# Patient Record
Sex: FEMALE | Race: WHITE | Employment: OTHER | URBAN - METROPOLITAN AREA
[De-identification: names, ages, dates, MRNs, and addresses within clinical notes are randomized per-mention and may not be internally consistent; named-entity substitution may affect disease eponyms.]

---

## 2017-04-19 ENCOUNTER — HOSPITAL ENCOUNTER (OUTPATIENT)
Age: 72
Discharge: HOME | End: 2017-04-19
Payer: MEDICARE

## 2017-04-19 ENCOUNTER — APPOINTMENT (OUTPATIENT)
Dept: GENERAL RADIOLOGY | Age: 72
End: 2017-04-19
Attending: PHYSICIAN ASSISTANT
Payer: MEDICARE

## 2017-04-19 VITALS
DIASTOLIC BLOOD PRESSURE: 73 MMHG | WEIGHT: 165 LBS | HEART RATE: 93 BPM | RESPIRATION RATE: 16 BRPM | OXYGEN SATURATION: 100 % | TEMPERATURE: 98 F | SYSTOLIC BLOOD PRESSURE: 165 MMHG | BODY MASS INDEX: 25 KG/M2

## 2017-04-19 DIAGNOSIS — S80.00XA CONTUSION OF KNEE, UNSPECIFIED LATERALITY, INITIAL ENCOUNTER: ICD-10-CM

## 2017-04-19 DIAGNOSIS — T07.XXXA ABRASIONS OF MULTIPLE SITES: ICD-10-CM

## 2017-04-19 DIAGNOSIS — S00.83XA FACIAL CONTUSION, INITIAL ENCOUNTER: Primary | ICD-10-CM

## 2017-04-19 PROCEDURE — 99203 OFFICE O/P NEW LOW 30 MIN: CPT

## 2017-04-19 PROCEDURE — 99204 OFFICE O/P NEW MOD 45 MIN: CPT

## 2017-04-19 PROCEDURE — 70150 X-RAY EXAM OF FACIAL BONES: CPT

## 2017-04-19 RX ORDER — HYDROCODONE BITARTRATE AND ACETAMINOPHEN 5; 325 MG/1; MG/1
1 TABLET ORAL ONCE
Status: COMPLETED | OUTPATIENT
Start: 2017-04-19 | End: 2017-04-19

## 2017-04-19 RX ORDER — HYDROCODONE BITARTRATE AND ACETAMINOPHEN 5; 325 MG/1; MG/1
1-2 TABLET ORAL EVERY 4 HOURS PRN
Qty: 20 TABLET | Refills: 0 | Status: SHIPPED | OUTPATIENT
Start: 2017-04-19 | End: 2017-04-26

## 2017-04-20 NOTE — ED PROVIDER NOTES
Patient Seen in: [facility] [place] [facility] In [place] [facility]    History   Patient presents with:  Fall (musculoskeletal, neurologic)    Stated Complaint: FACIAL INJ S/P FALL    HPI    71 yo female here with c/o swollen nose/chipped tooth and chin swelling after All other systems reviewed and negative except as noted above. PSFH elements reviewed from today and agreed except as otherwise stated in HPI.     Physical Exam       ED Triage Vitals   BP 04/19/17 2001 163/88 mmHg   Pulse 04/19/17 2001 93   Resp 04/19/1 4/19/2017  PROCEDURE:  XR FACIAL BONES, COMPLETE (MIN 3 VIEWS) (CPT=70150)  COMPARISON:  None. INDICATIONS:  FACIAL INJ S/P FALL  PATIENT STATED HISTORY: (As transcribed by Technologist)  Pt with facial injury after fall earlier today.  Pt denies any speci Schedule an appointment as soon as possible for a visit  or F/U with your dentist      Medications Prescribed:  Current Discharge Medication List    START taking these medications    HYDROcodone-acetaminophen 5-325 MG Oral Tab  Take 1-2 tablets by mouth ev

## 2023-06-14 ENCOUNTER — NURSE ONLY (OUTPATIENT)
Dept: ELECTROPHYSIOLOGY | Facility: HOSPITAL | Age: 78
End: 2023-06-14
Attending: EMERGENCY MEDICINE
Payer: MEDICARE

## 2023-06-14 ENCOUNTER — APPOINTMENT (OUTPATIENT)
Dept: GENERAL RADIOLOGY | Facility: HOSPITAL | Age: 78
End: 2023-06-14
Attending: EMERGENCY MEDICINE
Payer: MEDICARE

## 2023-06-14 ENCOUNTER — APPOINTMENT (OUTPATIENT)
Dept: CT IMAGING | Facility: HOSPITAL | Age: 78
End: 2023-06-14
Attending: EMERGENCY MEDICINE
Payer: MEDICARE

## 2023-06-14 ENCOUNTER — HOSPITAL ENCOUNTER (OUTPATIENT)
Facility: HOSPITAL | Age: 78
Setting detail: OBSERVATION
LOS: 1 days | Discharge: HOME | End: 2023-06-15
Attending: EMERGENCY MEDICINE | Admitting: HOSPITALIST
Payer: MEDICARE

## 2023-06-14 ENCOUNTER — APPOINTMENT (OUTPATIENT)
Dept: MRI IMAGING | Facility: HOSPITAL | Age: 78
End: 2023-06-14
Attending: EMERGENCY MEDICINE
Payer: MEDICARE

## 2023-06-14 DIAGNOSIS — S01.511A LIP LACERATION, INITIAL ENCOUNTER: ICD-10-CM

## 2023-06-14 DIAGNOSIS — S40.012A CONTUSION OF LEFT SHOULDER, INITIAL ENCOUNTER: ICD-10-CM

## 2023-06-14 DIAGNOSIS — R55 SYNCOPE AND COLLAPSE: Primary | ICD-10-CM

## 2023-06-14 DIAGNOSIS — R40.4 EPISODE OF ALTERED CONSCIOUSNESS: ICD-10-CM

## 2023-06-14 LAB
ALBUMIN SERPL-MCNC: 3.9 G/DL (ref 3.4–5)
ALBUMIN/GLOB SERPL: 1.1 {RATIO} (ref 1–2)
ALP LIVER SERPL-CCNC: 88 U/L
ALT SERPL-CCNC: 31 U/L
ANION GAP SERPL CALC-SCNC: 8 MMOL/L (ref 0–18)
APTT PPP: 24.3 SECONDS (ref 23.3–35.6)
AST SERPL-CCNC: 26 U/L (ref 15–37)
ATRIAL RATE: 76 BPM
BASOPHILS # BLD AUTO: 0.05 X10(3) UL (ref 0–0.2)
BASOPHILS NFR BLD AUTO: 0.7 %
BILIRUB SERPL-MCNC: 1.4 MG/DL (ref 0.1–2)
BUN BLD-MCNC: 15 MG/DL (ref 7–18)
CALCIUM BLD-MCNC: 9.2 MG/DL (ref 8.5–10.1)
CHLORIDE SERPL-SCNC: 105 MMOL/L (ref 98–112)
CO2 SERPL-SCNC: 26 MMOL/L (ref 21–32)
CREAT BLD-MCNC: 0.88 MG/DL
EOSINOPHIL # BLD AUTO: 0.09 X10(3) UL (ref 0–0.7)
EOSINOPHIL NFR BLD AUTO: 1.2 %
ERYTHROCYTE [DISTWIDTH] IN BLOOD BY AUTOMATED COUNT: 12.9 %
GFR SERPLBLD BASED ON 1.73 SQ M-ARVRAT: 67 ML/MIN/1.73M2 (ref 60–?)
GLOBULIN PLAS-MCNC: 3.5 G/DL (ref 2.8–4.4)
GLUCOSE BLD-MCNC: 130 MG/DL (ref 70–99)
GLUCOSE BLD-MCNC: 134 MG/DL (ref 70–99)
HCT VFR BLD AUTO: 45.8 %
HGB BLD-MCNC: 14.7 G/DL
IMM GRANULOCYTES # BLD AUTO: 0.01 X10(3) UL (ref 0–1)
IMM GRANULOCYTES NFR BLD: 0.1 %
INR BLD: 0.92 (ref 0.85–1.16)
LYMPHOCYTES # BLD AUTO: 1.57 X10(3) UL (ref 1–4)
LYMPHOCYTES NFR BLD AUTO: 21.3 %
MCH RBC QN AUTO: 29.9 PG (ref 26–34)
MCHC RBC AUTO-ENTMCNC: 32.1 G/DL (ref 31–37)
MCV RBC AUTO: 93.3 FL
MONOCYTES # BLD AUTO: 0.56 X10(3) UL (ref 0.1–1)
MONOCYTES NFR BLD AUTO: 7.6 %
NEUTROPHILS # BLD AUTO: 5.09 X10 (3) UL (ref 1.5–7.7)
NEUTROPHILS # BLD AUTO: 5.09 X10(3) UL (ref 1.5–7.7)
NEUTROPHILS NFR BLD AUTO: 69.1 %
OSMOLALITY SERPL CALC.SUM OF ELEC: 291 MOSM/KG (ref 275–295)
P AXIS: 68 DEGREES
P-R INTERVAL: 142 MS
PLATELET # BLD AUTO: 289 10(3)UL (ref 150–450)
POTASSIUM SERPL-SCNC: 3.6 MMOL/L (ref 3.5–5.1)
PROT SERPL-MCNC: 7.4 G/DL (ref 6.4–8.2)
PROTHROMBIN TIME: 12.3 SECONDS (ref 11.6–14.8)
Q-T INTERVAL: 400 MS
QRS DURATION: 80 MS
QTC CALCULATION (BEZET): 450 MS
R AXIS: 0 DEGREES
RBC # BLD AUTO: 4.91 X10(6)UL
SODIUM SERPL-SCNC: 139 MMOL/L (ref 136–145)
T AXIS: 4 DEGREES
TROPONIN I HIGH SENSITIVITY: 12 NG/L
TROPONIN I HIGH SENSITIVITY: 5 NG/L
VENTRICULAR RATE: 76 BPM
WBC # BLD AUTO: 7.4 X10(3) UL (ref 4–11)

## 2023-06-14 PROCEDURE — 70450 CT HEAD/BRAIN W/O DYE: CPT | Performed by: EMERGENCY MEDICINE

## 2023-06-14 PROCEDURE — 70553 MRI BRAIN STEM W/O & W/DYE: CPT | Performed by: EMERGENCY MEDICINE

## 2023-06-14 PROCEDURE — 95819 EEG AWAKE AND ASLEEP: CPT | Performed by: OTHER

## 2023-06-14 PROCEDURE — 70546 MR ANGIOGRAPH HEAD W/O&W/DYE: CPT | Performed by: EMERGENCY MEDICINE

## 2023-06-14 PROCEDURE — 73030 X-RAY EXAM OF SHOULDER: CPT | Performed by: EMERGENCY MEDICINE

## 2023-06-14 PROCEDURE — 70549 MR ANGIOGRAPH NECK W/O&W/DYE: CPT | Performed by: EMERGENCY MEDICINE

## 2023-06-14 RX ORDER — TRAMADOL HYDROCHLORIDE 50 MG/1
50 TABLET ORAL EVERY 6 HOURS PRN
Status: DISCONTINUED | OUTPATIENT
Start: 2023-06-14 | End: 2023-06-15

## 2023-06-14 RX ORDER — METOPROLOL SUCCINATE 25 MG/1
25 TABLET, EXTENDED RELEASE ORAL DAILY
Status: DISCONTINUED | OUTPATIENT
Start: 2023-06-15 | End: 2023-06-15

## 2023-06-14 RX ORDER — KETOROLAC TROMETHAMINE 15 MG/ML
15 INJECTION, SOLUTION INTRAMUSCULAR; INTRAVENOUS ONCE
Status: COMPLETED | OUTPATIENT
Start: 2023-06-14 | End: 2023-06-14

## 2023-06-14 RX ORDER — POTASSIUM CHLORIDE 20 MEQ/1
40 TABLET, EXTENDED RELEASE ORAL EVERY 4 HOURS
Status: COMPLETED | OUTPATIENT
Start: 2023-06-14 | End: 2023-06-15

## 2023-06-14 RX ORDER — KETOROLAC TROMETHAMINE 15 MG/ML
INJECTION, SOLUTION INTRAMUSCULAR; INTRAVENOUS
Status: DISPENSED
Start: 2023-06-14 | End: 2023-06-15

## 2023-06-14 RX ORDER — MELATONIN
3 NIGHTLY PRN
Status: DISCONTINUED | OUTPATIENT
Start: 2023-06-14 | End: 2023-06-15

## 2023-06-14 RX ORDER — ONDANSETRON 2 MG/ML
4 INJECTION INTRAMUSCULAR; INTRAVENOUS EVERY 6 HOURS PRN
Status: DISCONTINUED | OUTPATIENT
Start: 2023-06-14 | End: 2023-06-15

## 2023-06-14 RX ORDER — GADOTERATE MEGLUMINE 376.9 MG/ML
15 INJECTION INTRAVENOUS
Status: COMPLETED | OUTPATIENT
Start: 2023-06-14 | End: 2023-06-14

## 2023-06-14 RX ORDER — ACETAMINOPHEN 325 MG/1
650 TABLET ORAL EVERY 6 HOURS PRN
Status: DISCONTINUED | OUTPATIENT
Start: 2023-06-14 | End: 2023-06-15

## 2023-06-14 RX ORDER — ATORVASTATIN CALCIUM 10 MG/1
10 TABLET, FILM COATED ORAL NIGHTLY
Status: DISCONTINUED | OUTPATIENT
Start: 2023-06-14 | End: 2023-06-15

## 2023-06-14 NOTE — ED PROVIDER NOTES
This is a procedure note only. See physician note for full care details. Procedure - Suture    UNIVERSAL PROTOCOL    - Verbal consent obtained by patient and/or guardian for wound closure. - Procedure / Risks / Benefits/ Alternatives discussed, with questions answered to satisfaction. ANESTHESIA  Method (topical, local, nerve block): Topical (LET)    TREATMENT  Cleansed with: saline and Chlorhexidine   Amount of cleaning: standard  Location: Jagged to Left upper lip involving vermilion border. Length: approx 2cm  Repair method (sutures, staples, steri strips): sutures 6-0  # Sutures / staples / steri strips: 7  Approximation (close, loose): close approximation with attempt to align vermilion border.   Repair type (simple, complex): simple  Dressing: none  Procedure Completion: Tolerated well without immediate complications

## 2023-06-14 NOTE — PROCEDURES
.  EEG REPORT;    Reason for Examination: Syncopal episode    Technical Summary:   18 Channels of EEG and 1 Channel of EKG was performed utilizing internation 10/20 method. Background Activity: The background activity consisted of 9 Hz waveforms, reactive to eye opening/ external stimulation. Activation:    Hyperventilation:   Not performed. Photic Stimulation:  Driving response seen. Sleep:  Stage I sleep seen. Impression:  Normal EEG. No focal, lateralized or generalized epileptiform activity seen. Missy Avila MD  [facility].

## 2023-06-14 NOTE — PLAN OF CARE
NURSING ADMISSION NOTE      Patient admitted via Cart  Oriented to room. Safety precautions initiated. Bed in low position. Call light in reach.   Navigator completed  Neuro consulted  Staff will continue to monitor

## 2023-06-14 NOTE — ED QUICK NOTES
Rounding Completed    Plan of Care reviewed. Waiting for inpatient room. Elimination needs assessed. Bed is locked and in lowest position. Call light within reach.

## 2023-06-14 NOTE — ED INITIAL ASSESSMENT (HPI)
PT TO ER FROM HOME W/ DAUGHTER. PATIENT WENT ON A WALK W/ HER DOG. PER DAUGHTER, PATIENT COME HOME FROM WALK 3-4 MINUTES LATER AND WAS CONFUSED & WAS STUMBLING IN KITCHEN. CUT NOTICED TO LEFT LIP. PATIENT DOES NOT REMEMBER FALLING.

## 2023-06-14 NOTE — ED QUICK NOTES
Orders for admission, patient is aware of plan and ready to go upstairs. Any questions, please call ED RN Apoorva Patrick at extension [phone].      Patient Covid vaccination status: Fully vaccinated     COVID Test Ordered in ED: None    COVID Suspicion at Admission: N/A    Running Infusions:  None    Mental Status/LOC at time of transport: a/ox4    Other pertinent information:   CIWA score: N/A   NIH score:  N/A

## 2023-06-15 ENCOUNTER — APPOINTMENT (OUTPATIENT)
Dept: CV DIAGNOSTICS | Facility: HOSPITAL | Age: 78
End: 2023-06-15
Attending: HOSPITALIST
Payer: MEDICARE

## 2023-06-15 VITALS
BODY MASS INDEX: 25.9 KG/M2 | DIASTOLIC BLOOD PRESSURE: 81 MMHG | OXYGEN SATURATION: 96 % | RESPIRATION RATE: 20 BRPM | TEMPERATURE: 98 F | HEART RATE: 77 BPM | SYSTOLIC BLOOD PRESSURE: 136 MMHG | HEIGHT: 67 IN | WEIGHT: 165 LBS

## 2023-06-15 LAB
ANION GAP SERPL CALC-SCNC: 6 MMOL/L (ref 0–18)
BASOPHILS # BLD AUTO: 0.02 X10(3) UL (ref 0–0.2)
BASOPHILS NFR BLD AUTO: 0.2 %
BILIRUB UR QL STRIP.AUTO: NEGATIVE
BUN BLD-MCNC: 13 MG/DL (ref 7–18)
CALCIUM BLD-MCNC: 9.3 MG/DL (ref 8.5–10.1)
CHLORIDE SERPL-SCNC: 108 MMOL/L (ref 98–112)
CLARITY UR REFRACT.AUTO: CLEAR
CO2 SERPL-SCNC: 25 MMOL/L (ref 21–32)
CREAT BLD-MCNC: 0.74 MG/DL
EOSINOPHIL # BLD AUTO: 0.11 X10(3) UL (ref 0–0.7)
EOSINOPHIL NFR BLD AUTO: 1.3 %
ERYTHROCYTE [DISTWIDTH] IN BLOOD BY AUTOMATED COUNT: 13.2 %
GFR SERPLBLD BASED ON 1.73 SQ M-ARVRAT: 83 ML/MIN/1.73M2 (ref 60–?)
GLUCOSE BLD-MCNC: 114 MG/DL (ref 70–99)
GLUCOSE UR STRIP.AUTO-MCNC: NEGATIVE MG/DL
HCT VFR BLD AUTO: 41.8 %
HGB BLD-MCNC: 13.7 G/DL
IMM GRANULOCYTES # BLD AUTO: 0.02 X10(3) UL (ref 0–1)
IMM GRANULOCYTES NFR BLD: 0.2 %
KETONES UR STRIP.AUTO-MCNC: NEGATIVE MG/DL
LEUKOCYTE ESTERASE UR QL STRIP.AUTO: NEGATIVE
LYMPHOCYTES # BLD AUTO: 1.23 X10(3) UL (ref 1–4)
LYMPHOCYTES NFR BLD AUTO: 14.2 %
MCH RBC QN AUTO: 30 PG (ref 26–34)
MCHC RBC AUTO-ENTMCNC: 32.8 G/DL (ref 31–37)
MCV RBC AUTO: 91.7 FL
MONOCYTES # BLD AUTO: 0.87 X10(3) UL (ref 0.1–1)
MONOCYTES NFR BLD AUTO: 10.1 %
NEUTROPHILS # BLD AUTO: 6.39 X10 (3) UL (ref 1.5–7.7)
NEUTROPHILS # BLD AUTO: 6.39 X10(3) UL (ref 1.5–7.7)
NEUTROPHILS NFR BLD AUTO: 74 %
NITRITE UR QL STRIP.AUTO: NEGATIVE
OSMOLALITY SERPL CALC.SUM OF ELEC: 289 MOSM/KG (ref 275–295)
PH UR STRIP.AUTO: 5 [PH] (ref 5–8)
PLATELET # BLD AUTO: 263 10(3)UL (ref 150–450)
POTASSIUM SERPL-SCNC: 4.7 MMOL/L (ref 3.5–5.1)
POTASSIUM SERPL-SCNC: 4.7 MMOL/L (ref 3.5–5.1)
PROT UR STRIP.AUTO-MCNC: NEGATIVE MG/DL
RBC # BLD AUTO: 4.56 X10(6)UL
SODIUM SERPL-SCNC: 139 MMOL/L (ref 136–145)
SP GR UR STRIP.AUTO: 1.01 (ref 1–1.03)
TROPONIN I HIGH SENSITIVITY: 9 NG/L
UROBILINOGEN UR STRIP.AUTO-MCNC: <2 MG/DL
WBC # BLD AUTO: 8.6 X10(3) UL (ref 4–11)

## 2023-06-15 PROCEDURE — 99203 OFFICE O/P NEW LOW 30 MIN: CPT | Performed by: OTHER

## 2023-06-15 PROCEDURE — 93306 TTE W/DOPPLER COMPLETE: CPT | Performed by: HOSPITALIST

## 2023-06-15 RX ORDER — TRAMADOL HYDROCHLORIDE 50 MG/1
50 TABLET ORAL EVERY 6 HOURS PRN
Qty: 16 TABLET | Refills: 0 | Status: SHIPPED | OUTPATIENT
Start: 2023-06-15

## 2023-06-15 RX ORDER — LAMOTRIGINE 25 MG/1
25 TABLET ORAL 2 TIMES DAILY
Status: DISCONTINUED | OUTPATIENT
Start: 2023-06-15 | End: 2023-06-15

## 2023-06-15 RX ORDER — LAMOTRIGINE 25 MG/1
50 TABLET ORAL 2 TIMES DAILY
Qty: 60 TABLET | Refills: 2 | Status: SHIPPED | OUTPATIENT
Start: 2023-06-22

## 2023-06-15 RX ORDER — LAMOTRIGINE 25 MG/1
25 TABLET ORAL 2 TIMES DAILY
Qty: 13 TABLET | Refills: 0 | Status: SHIPPED | OUTPATIENT
Start: 2023-06-15

## 2023-06-15 NOTE — PROGRESS NOTES
06/15/23 1054 06/15/23 1055 06/15/23 1056   Vital Signs   Pulse 73 79 89   /71 141/77 136/81   MAP (mmHg) 89 96 96   BP Location Left arm Left arm Left arm   BP Method Automatic Automatic Automatic   Patient Position Lying Sitting Standing

## 2023-06-15 NOTE — PROGRESS NOTES
06/15/23 1043   Provider Notification   Reason for Communication New consult   Provider Name Other (comment)  [facility] Cards NP)   Method of Communication Page   Response Waiting for response   Notification Time [phone]

## 2023-06-15 NOTE — PROGRESS NOTES
NURSING DISCHARGE NOTE    Discharged Home via Ambulatory. Accompanied by Family member  Belongings Taken by patient/family. IV and tele removed. The patient verbalized understanding of the discharge instructions.

## 2023-06-15 NOTE — CM/SW NOTE
This is a Code 40. Patient failed inpatient criteria. Second level of review completed and supports observation. UR committee in agreement. Discussed with Dr. Noah Biswas  who approves observation status. MOON given to the patient and order written.

## 2023-06-15 NOTE — PROGRESS NOTES
Pt A&Ox4 Room Air  Resting in Bed  Denies pain at this time  Meds Given per Mar  Voids Standby Assist  MRI Completed see results  Neuro Following. ..   Safety precaution Maintained

## 2023-06-15 NOTE — PLAN OF CARE
The patient is A/Ox4   On RA, no SOB  Tele - NSR  Vitals Stable  Afebrile  C/O of generalized \"aching\", PRN Tramadol given with good results. Neuro s/o  Echo completed. Cards consulted, reviewed echo results,  cleared the patient for dc home. PT eval pending. Dc planning. Family is at the bedside. Safety precautions in place. Staff will continue to monitor.              Problem: Patient/Family Goals  Goal: Patient/Family Long Term Goal  Description: Patient's Long Term Goal: dc    Interventions:  - medications   - See additional Care Plan goals for specific interventions  Outcome: Progressing  Goal: Patient/Family Short Term Goal  Description: Patient's Short Term Goal: safety    Interventions:   - frequent rounding   - See additional Care Plan goals for specific interventions  Outcome: Progressing     Problem: PAIN - ADULT  Goal: Verbalizes/displays adequate comfort level or patient's stated pain goal  Description: INTERVENTIONS:  - Encourage pt to monitor pain and request assistance  - Assess pain using appropriate pain scale  - Administer analgesics based on type and severity of pain and evaluate response  - Implement non-pharmacological measures as appropriate and evaluate response  - Consider cultural and social influences on pain and pain management  - Manage/alleviate anxiety  - Utilize distraction and/or relaxation techniques  - Monitor for opioid side effects  - Notify MD/LIP if interventions unsuccessful or patient reports new pain  - Anticipate increased pain with activity and pre-medicate as appropriate  Outcome: Progressing     Problem: RISK FOR INFECTION - ADULT  Goal: Absence of fever/infection during anticipated neutropenic period  Description: INTERVENTIONS  - Monitor WBC  - Administer growth factors as ordered  - Implement neutropenic guidelines  Outcome: Progressing     Problem: SAFETY ADULT - FALL  Goal: Free from fall injury  Description: INTERVENTIONS:  - Assess pt frequently for physical needs  - Identify cognitive and physical deficits and behaviors that affect risk of falls.   - [place] fall precautions as indicated by assessment.  - Educate pt/family on patient safety including physical limitations  - Instruct pt to call for assistance with activity based on assessment  - Modify environment to reduce risk of injury  - Provide assistive devices as appropriate  - Consider OT/PT consult to assist with strengthening/mobility  - Encourage toileting schedule  Outcome: Progressing     Problem: DISCHARGE PLANNING  Goal: Discharge to home or other facility with appropriate resources  Description: INTERVENTIONS:  - Identify barriers to discharge w/pt and caregiver  - Include patient/family/discharge partner in discharge planning  - Arrange for needed discharge resources and transportation as appropriate  - Identify discharge learning needs (meds, wound care, etc)  - Arrange for interpreters to assist at discharge as needed  - Consider post-discharge preferences of patient/family/discharge partner  - Complete POLST form as appropriate  - Assess patient's ability to be responsible for managing their own health  - Refer to Case Management Department for coordinating discharge planning if the patient needs post-hospital services based on physician/LIP order or complex needs related to functional status, cognitive ability or social support system  Outcome: Progressing

## 2023-06-27 ENCOUNTER — OFFICE VISIT (OUTPATIENT)
Dept: NEUROLOGY | Facility: CLINIC | Age: 78
End: 2023-06-27
Payer: MEDICARE

## 2023-06-27 VITALS
DIASTOLIC BLOOD PRESSURE: 82 MMHG | RESPIRATION RATE: 16 BRPM | WEIGHT: 165 LBS | HEART RATE: 103 BPM | SYSTOLIC BLOOD PRESSURE: 158 MMHG | BODY MASS INDEX: 25.9 KG/M2 | HEIGHT: 67 IN

## 2023-06-27 DIAGNOSIS — R56.9 SEIZURE (HCC): Primary | ICD-10-CM

## 2023-06-27 PROCEDURE — 99215 OFFICE O/P EST HI 40 MIN: CPT | Performed by: OTHER

## 2023-06-27 RX ORDER — LAMOTRIGINE 25 MG/1
50 TABLET ORAL 2 TIMES DAILY
Qty: 120 TABLET | Refills: 2 | Status: SHIPPED | OUTPATIENT
Start: 2023-06-27

## 2023-07-03 ENCOUNTER — LAB ENCOUNTER (OUTPATIENT)
Dept: LAB | Age: 78
End: 2023-07-03
Attending: Other
Payer: MEDICARE

## 2023-07-03 DIAGNOSIS — R56.9 SEIZURE (HCC): ICD-10-CM

## 2023-07-03 PROCEDURE — 36415 COLL VENOUS BLD VENIPUNCTURE: CPT

## 2023-07-03 PROCEDURE — 80175 DRUG SCREEN QUAN LAMOTRIGINE: CPT

## 2023-07-05 LAB — LAMOTRIGINE LVL: 4.1 UG/ML

## 2023-07-31 ENCOUNTER — HOSPITAL ENCOUNTER (OUTPATIENT)
Age: 78
Discharge: HOME | End: 2023-07-31
Payer: MEDICARE

## 2023-07-31 VITALS
TEMPERATURE: 99 F | OXYGEN SATURATION: 97 % | DIASTOLIC BLOOD PRESSURE: 94 MMHG | SYSTOLIC BLOOD PRESSURE: 161 MMHG | BODY MASS INDEX: 24.82 KG/M2 | HEIGHT: 67.5 IN | WEIGHT: 160 LBS | HEART RATE: 84 BPM | RESPIRATION RATE: 18 BRPM

## 2023-07-31 DIAGNOSIS — B02.8 HERPES ZOSTER WITH COMPLICATION: Primary | ICD-10-CM

## 2023-07-31 PROCEDURE — 99203 OFFICE O/P NEW LOW 30 MIN: CPT | Performed by: NURSE PRACTITIONER

## 2023-07-31 RX ORDER — VALACYCLOVIR HYDROCHLORIDE 1 G/1
1000 TABLET, FILM COATED ORAL 3 TIMES DAILY
Qty: 21 TABLET | Refills: 0 | Status: SHIPPED | OUTPATIENT
Start: 2023-07-31 | End: 2023-08-07

## 2023-07-31 NOTE — ED INITIAL ASSESSMENT (HPI)
Patient with vesicular rash to left inner thigh  Patient states she had some itching yesterday which now resolved.  No drainage   No pain

## 2023-09-27 ENCOUNTER — OFFICE VISIT (OUTPATIENT)
Dept: NEUROLOGY | Facility: CLINIC | Age: 78
End: 2023-09-27
Payer: MEDICARE

## 2023-09-27 VITALS
WEIGHT: 160 LBS | SYSTOLIC BLOOD PRESSURE: 138 MMHG | RESPIRATION RATE: 16 BRPM | BODY MASS INDEX: 24.82 KG/M2 | HEIGHT: 67.5 IN | DIASTOLIC BLOOD PRESSURE: 78 MMHG | HEART RATE: 84 BPM

## 2023-09-27 DIAGNOSIS — R56.9 SEIZURE (HCC): Primary | ICD-10-CM

## 2023-09-27 PROCEDURE — 99213 OFFICE O/P EST LOW 20 MIN: CPT | Performed by: OTHER

## 2023-09-27 RX ORDER — LAMOTRIGINE 100 MG/1
TABLET ORAL
Qty: 180 TABLET | Refills: 1 | Status: SHIPPED | OUTPATIENT
Start: 2023-09-27

## 2023-12-12 ENCOUNTER — OFFICE VISIT (OUTPATIENT)
Dept: NEUROLOGY | Facility: CLINIC | Age: 78
End: 2023-12-12
Payer: MEDICARE

## 2023-12-12 VITALS
RESPIRATION RATE: 16 BRPM | DIASTOLIC BLOOD PRESSURE: 78 MMHG | BODY MASS INDEX: 24.82 KG/M2 | HEART RATE: 88 BPM | WEIGHT: 160 LBS | HEIGHT: 67.5 IN | SYSTOLIC BLOOD PRESSURE: 146 MMHG

## 2023-12-12 DIAGNOSIS — R56.9 SEIZURE (HCC): Primary | ICD-10-CM

## 2023-12-12 PROCEDURE — 99213 OFFICE O/P EST LOW 20 MIN: CPT | Performed by: OTHER

## 2023-12-12 RX ORDER — LAMOTRIGINE 100 MG/1
100 TABLET ORAL 2 TIMES DAILY
COMMUNITY

## 2023-12-13 ENCOUNTER — LAB ENCOUNTER (OUTPATIENT)
Dept: LAB | Age: 78
End: 2023-12-13
Attending: Other
Payer: MEDICARE

## 2023-12-13 DIAGNOSIS — R56.9 SEIZURE (HCC): ICD-10-CM

## 2023-12-13 PROCEDURE — 80175 DRUG SCREEN QUAN LAMOTRIGINE: CPT

## 2023-12-13 PROCEDURE — 36415 COLL VENOUS BLD VENIPUNCTURE: CPT

## 2023-12-15 LAB — LAMOTRIGINE LVL: 7.6 UG/ML

## 2024-01-03 ENCOUNTER — TELEPHONE (OUTPATIENT)
Dept: NEUROLOGY | Facility: CLINIC | Age: 79
End: 2024-01-03

## 2024-01-03 ENCOUNTER — HOSPITAL ENCOUNTER (OUTPATIENT)
Age: 79
Discharge: HOME | End: 2024-01-03
Payer: MEDICARE

## 2024-01-03 VITALS
TEMPERATURE: 98 F | OXYGEN SATURATION: 97 % | HEART RATE: 100 BPM | SYSTOLIC BLOOD PRESSURE: 135 MMHG | DIASTOLIC BLOOD PRESSURE: 82 MMHG | RESPIRATION RATE: 20 BRPM

## 2024-01-03 DIAGNOSIS — R21 RASH: Primary | ICD-10-CM

## 2024-01-03 LAB
#MXD IC: 0.6 X10ˆ3/UL (ref 0.1–1)
HCT VFR BLD AUTO: 41.9 %
HGB BLD-MCNC: 13.7 G/DL
LYMPHOCYTES # BLD AUTO: 1.5 X10ˆ3/UL (ref 1–4)
LYMPHOCYTES NFR BLD AUTO: 23.1 %
MCH RBC QN AUTO: 30.2 PG (ref 26–34)
MCHC RBC AUTO-ENTMCNC: 32.7 G/DL (ref 31–37)
MCV RBC AUTO: 92.3 FL (ref 80–100)
MIXED CELL %: 9.8 %
NEUTROPHILS # BLD AUTO: 4.4 X10ˆ3/UL (ref 1.5–7.7)
NEUTROPHILS NFR BLD AUTO: 67.1 %
PLATELET # BLD AUTO: 339 X10ˆ3/UL (ref 150–450)
RBC # BLD AUTO: 4.54 X10ˆ6/UL
WBC # BLD AUTO: 6.5 X10ˆ3/UL (ref 4–11)

## 2024-01-03 PROCEDURE — 99213 OFFICE O/P EST LOW 20 MIN: CPT | Performed by: NURSE PRACTITIONER

## 2024-01-03 PROCEDURE — 85025 COMPLETE CBC W/AUTO DIFF WBC: CPT | Performed by: NURSE PRACTITIONER

## 2024-01-03 RX ORDER — CLOTRIMAZOLE AND BETAMETHASONE DIPROPIONATE 10; .64 MG/G; MG/G
1 CREAM TOPICAL 2 TIMES DAILY
Qty: 1 EACH | Refills: 0 | Status: SHIPPED | OUTPATIENT
Start: 2024-01-03 | End: 2024-01-17

## 2024-01-03 NOTE — DISCHARGE INSTRUCTIONS
Apply the cream as directed.  Follow-up with dermatology.  If you get any rash in your mouth, fever, or are feeling worse go to the emergency room.

## 2024-01-03 NOTE — ED INITIAL ASSESSMENT (HPI)
Previous hx of Shingles.  Received vaccine early December 2023.  Rash to lower back & left hip & goes down leg, and under right arm.  Denies pain.  + itching.

## 2024-01-03 NOTE — TELEPHONE ENCOUNTER
Pt stated she has a rash which may be related to medication side effects    Call pt to discuss and advise.

## 2024-01-03 NOTE — ED PROVIDER NOTES
Patient Seen in: [facility]      History     Chief Complaint   Patient presents with    Rash Skin Problem     Stated Complaint: Skin Rash    Subjective:   HPI  78-year-old female presents complaining of rash to her left hip, right lower back, and right axillary region that is itchy without any pain for the past few days.  She previously had a similar rash to her thigh and was diagnosed with shingles in July.  She had her shingles vaccine in December.  She recently had her Lamictal level drawn which was normal.  She denies any fever or intraoral lesions.  She denies any new food or products.    Objective:   Past Medical History:   Diagnosis Date    Elevated glucose 01/16/2015    High blood pressure     High cholesterol     Hyperlipidemia 01/11/2015    Hypertension               Past Surgical History:   Procedure Laterality Date    EXCIS LUMBAR DISK,ONE LEVEL      REMOVAL OF TONSILS,12+ Y/O                  Social History     Socioeconomic History    Marital status:     Number of children: 3   Occupational History    Occupation: Retired    Tobacco Use    Smoking status: Former     Passive exposure: Never    Smokeless tobacco: Never   Vaping Use    Vaping Use: Never used   Substance and Sexual Activity    Alcohol use: No     Alcohol/week: 0.0 standard drinks of alcohol    Drug use: No   Other Topics Concern    Caffeine Concern Yes     Comment: 1-2 diet cokes a day    Exercise No              Review of Systems   All other systems reviewed and are negative.      Positive for stated complaint: Skin Rash  Other systems are as noted in HPI.  Constitutional and vital signs reviewed.      All other systems reviewed and negative except as noted above.    Physical Exam     ED Triage Vitals [01/03/24 0957]   BP (!) 156/97   Pulse 100   Resp 20   Temp 97.8 °F (36.6 °C)   Temp src Temporal   SpO2 97 %   O2 Device None (Room air)       Current:/82   Pulse 100   Temp 97.8 °F  (36.6 °C) (Temporal)   Resp 20   SpO2 97%         Physical Exam  Vitals and nursing note reviewed.   Constitutional:       Appearance: She is well-developed. She is not ill-appearing or toxic-appearing.   HENT:      Mouth/Throat:      Comments: No intraoral lesions  Cardiovascular:      Rate and Rhythm: Normal rate and regular rhythm.      Heart sounds: Normal heart sounds.   Pulmonary:      Effort: Pulmonary effort is normal.      Breath sounds: Normal breath sounds.   Skin:     General: Skin is warm and dry.      Findings: Rash (Erythematous circular scaly maculopapular rash to her left hip, right lower back, and right axillary region.  No petechiae or purpura) present.   Neurological:      Mental Status: She is alert and oriented to person, place, and time.               ED Course     Labs Reviewed   POCT CBC                      MDM     Medical Decision Making  78-year-old female presents complaining of rash to her left hip, right lower back, and right axillary region that is itchy without any pain for the past few days.  She previously had a similar rash to her thigh and was diagnosed with shingles in July.  She had her shingles vaccine in December.  She recently had her Lamictal level drawn which was normal.  She denies any fever or intraoral lesions.  She denies any new food or products.    Clinical impression: Rash    Differential diagnosis: Rash, contact dermatitis, tinea, Mcclendon-Michael's    Patient is well-appearing and afebrile.  She has no intraoral lesions.  There is no sloughing.  There is slight scaliness to the rash.  Rash is most probable fungal or contact dermatitis.  Patient discharged with with clotrimazole betamethasone cream and follow-up with dermatology.  I discussed with her if she gets any intraoral lesions, fever, or is generally feeling unwell to go to the emergency room.     Problems Addressed:  Rash: acute illness or injury        Disposition and Plan     Clinical Impression:  1.  Rash         Disposition:  Discharge  1/3/2024 10:34 am    Follow-up:  Lupe Sevilla  [address] [place] [address]  [facility] [address]  [phone]    Call       Jovanny Alfonso MD  [address] [facility] [address] [phone]    Call       Jake Morales MD  [address] [facility] [address]  [facility] [id number] [phone]    Call             Medications Prescribed:  Discharge Medication List as of 1/3/2024 10:38 AM        START taking these medications    Details   clotrimazole-betamethasone 1-0.05 % External Cream Apply 1 Application topically 2 (two) times daily for 14 days., Normal, Disp-1 each, R-0

## 2024-01-03 NOTE — TELEPHONE ENCOUNTER
Rash on lower R back above hip. Football shape, 4\" diameter. Is growing, heading over spine to L side.  Raised red, no weeping.  Denies pain.  Started one week ago.  Using BENADRYL cream and Calamine lotion.    Started LAMOTRIGINE 6/2023, went to ER on 7/31 with similar rash and was given VALACYCLOVIR 1 G TID x 7 days.  Resolved within weeks per patient.    LAMOTRIGINE dose increased 4 months ago, now 100 mg BID.      Did get SHINGLES vaccine on 12/12/2023.    Advised to contact PCP to have rash visualized.    Routed to provider.

## 2024-03-18 RX ORDER — LAMOTRIGINE 100 MG/1
100 TABLET ORAL 2 TIMES DAILY
Qty: 180 TABLET | Refills: 1 | Status: SHIPPED | OUTPATIENT
Start: 2024-03-18

## 2024-03-18 NOTE — TELEPHONE ENCOUNTER
Medication: Lamotrigine 100 mg     Date of last refill: 09/27/2023 with 1 addt refill  [place]      Last office visit: 12/12/2023  Due back to clinic per last office note:    Date next office visit scheduled:    Future Appointments   Date Time Provider Department Center   6/19/2024  1:00 PM Felix Sam MD [facility]           Last OV note recommendation:    Impression/ Plan:  Rossy Rdz is a 78 year old female with PMHx including but not limited to HTN, HLD, who presented to [facility] 6/7/2023.  Patient was seen by neurology in consultation at hospital but first seen by this author 6/7/2023.     Per discussion with patient as well as chart review, patient presented to ER 6/14/2023, after having a fall and loss of awareness. Patient reportedly was walking her dog and then returned home and daughter noted she was confused and appeared to have bitten her lip.  Patient did not recall going on the walk. MRI brain was done and showed no acute stroke. EEG was normal but given her presentation, she was started on anti-seizure medication with lamotrigine (Lamictal) and slowly titrated up to 50 mg bid.         Currently, she denies any recurrent seizures and denies any events of loss of awareness or auras of odd tastes, smells or sounds. She denies side effects of balance issues, falls, double vision or rash on the lamotrigine but feels drowsy.          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  She denies prior history of stroke but admits she fell down a couple of years ago when walking and states she had injury as a child where she \"cracked her head\" on the left side of the head; question is raised if this is risk factor for seizures; EEG was normal but given presentation, suspect underlying seizure disorder and will continue Lamictal.  She is now up  to 100 mg bid from prior dose of 50 mg bid and doing well- patient advised typical maintenance dose for monotherapy ~200-300 mg / daily and recommend continue 100 mg bid- will check levels.      Otherwise, patient has overall been tolerating well with no rash or significant side effects and no signs of toxicity on exam      In addition, patient advised to monitor for subtle signs of seizures and educated on seizure safety.   Patient with no seizures in 6 months and advised ok to drive     1. Seizure (HCC)  As noted above   - Lamotrigine (Lamictal), Serum; Future     Return in about 6 months (around 6/12/2024).

## 2024-06-19 ENCOUNTER — LAB ENCOUNTER (OUTPATIENT)
Dept: LAB | Age: 79
End: 2024-06-19
Attending: Other

## 2024-06-19 ENCOUNTER — OFFICE VISIT (OUTPATIENT)
Dept: NEUROLOGY | Facility: CLINIC | Age: 79
End: 2024-06-19

## 2024-06-19 VITALS
HEIGHT: 67.5 IN | WEIGHT: 160 LBS | HEART RATE: 80 BPM | DIASTOLIC BLOOD PRESSURE: 84 MMHG | RESPIRATION RATE: 16 BRPM | BODY MASS INDEX: 24.82 KG/M2 | SYSTOLIC BLOOD PRESSURE: 142 MMHG

## 2024-06-19 DIAGNOSIS — R26.81 UNSTEADY GAIT: ICD-10-CM

## 2024-06-19 DIAGNOSIS — R20.8 DECREASED VIBRATORY SENSE: ICD-10-CM

## 2024-06-19 DIAGNOSIS — R56.9 SEIZURE (HCC): Primary | ICD-10-CM

## 2024-06-19 DIAGNOSIS — R56.9 SEIZURE (HCC): ICD-10-CM

## 2024-06-19 LAB — VIT B12 SERPL-MCNC: 447 PG/ML (ref 193–986)

## 2024-06-19 PROCEDURE — 82607 VITAMIN B-12: CPT

## 2024-06-19 PROCEDURE — 99214 OFFICE O/P EST MOD 30 MIN: CPT | Performed by: OTHER

## 2024-06-19 PROCEDURE — 36415 COLL VENOUS BLD VENIPUNCTURE: CPT

## 2024-06-19 PROCEDURE — 80175 DRUG SCREEN QUAN LAMOTRIGINE: CPT

## 2024-06-19 NOTE — PROGRESS NOTES
[facility] Progress Note    HPI  Chief Complaint   Patient presents with    Seizures     Reports no recent events       Initial notes as per 6/27/2023:   \"    Rossy Rdz is a 79 year old female with PMHx including but not limited to HTN, HLD, who presents to establish care.  Patient was recently seen by neurology in consultation at hospital but this is patient's first visit to this author.      Per discussion with patient as well as chart review, patient presented to ER 6/14/2023, after having a fall and loss of awareness. Patient reportedly was walking her dog and then returned home and daughter noted she was confused and appeared to have bitten her lip.  Patient did not recall going on the walk. MRI brain was done and showed no acute stroke. EEG was normal but given her presentation, she was started on anti-seizure medication with lamotrigine (Lamictal) and has been slowly titrated up to current dose of 50 mg bid.        Currently, she denies any recurrent seizures and denies any events of loss of awareness or auras of odd tastes, smells or sounds. She denies side effects balance issues, falls, double vision or rash on the lamotrigine but feels drowsy.       She denies prior history of stroke but admits she fell down a couple of years ago when walking and states she had injury as a child where she \"cracked her head\" on the left side of the head; she denies episodes of loss of awareness otherwise but has had several falls.     Otherwise, patient denies any recent weight change, fevers, chills, nausea, double vision/ blurry vision / loss of vision, chest pain, palpitations, shortness of breath, rashes, joint pains, bowel / bladder incontinence or mood issues. \"       Prior notes as per 9/27/2023.  Patient last seen 6/27/2023.  Since last visit, she has remained on Lamictal 50 mg bid. She denies any seizures or episodes of loss of awareness or auras of odd tastes, smells or sounds; no side  effects of balance issues, double vision, rash or falls. She admits to some \"brain fog\" episodes but no loss of awareness.  She has not had any additional head trauma.  She denies waking up in AM having wet the bed or bitten her tongue.         Prior notes as per 12/12/2023.  Patient last seen 9/27/2023.  Since last visit, she has been increased on Lamictal from 50 mg bid up to 100 mg bid. On this dose, she denies any seizures or episodes of loss of awareness or auras of odd tastes, smells or sounds; no side effects of balance issues, double vision, rash or falls.     She denies waking up in AM having wet the bed or bitten her tongue.      Patient last seen 12/12/2023.  She has been doing well overall and remains on Lamictal 100 mg bid.  She did have a rash ~1/3/2024, which was on right lower back above hip but did not progress to mucosal surfaces or hands and resolved with anti-fungal cream; she denies rash since this time and denies any seizures or episodes of loss of awareness or auras of odd tastes, smells or sounds; no side effects of double vision, rash or falls, but has some balance issues.     She denies waking up in AM having wet the bed or bitten her tongue.       Past Medical History:    Elevated glucose    High blood pressure    High cholesterol    Hyperlipidemia    Hypertension     Past Surgical History:   Procedure Laterality Date    Excis lumbar disk,one level      Removal of tonsils,12+ y/o       Family History   Problem Relation Age of Onset    Cancer Father         stomach CA    Cancer Mother         breast CA     Social History     Socioeconomic History    Marital status:     Number of children: 3   Occupational History    Occupation: Retired    Tobacco Use    Smoking status: Former     Passive exposure: Never    Smokeless tobacco: Never   Vaping Use    Vaping status: Never Used   Substance and Sexual Activity    Alcohol use: No     Alcohol/week: 0.0 standard drinks of  alcohol    Drug use: No   Other Topics Concern    Caffeine Concern Yes     Comment: 1-2 diet cokes a day    Exercise No       No Known Allergies      Current Outpatient Medications:     lamoTRIgine 100 MG Oral Tab, Take 1 tablet (100 mg total) by mouth 2 (two) times daily., Disp: 180 tablet, Rfl: 1    atorvastatin 10 MG Oral Tab, TAKE 1 TABLET BY MOUTH EVERYDAY AT BEDTIME, Disp: 90 tablet, Rfl: 2    Fluticasone Propionate (FLONASE NA), by Nasal route., Disp: , Rfl:     metoprolol succinate 25 MG Oral Tablet 24 Hr, Take 1 tablet (25 mg total) by mouth daily., Disp: 90 tablet, Rfl: 3    MULTIVITAMINS OR TABS, 1 TABLET DAILY, Disp: , Rfl:     Review of Systems:  No chest pain or palpitations; otherwise as noted in HPI.    Exam:  /84 (BP Location: Left arm, Patient Position: Sitting, Cuff Size: adult)   Pulse 80   Resp 16   Ht 67.5\"   Wt 160 lb (72.6 kg)   BMI 24.69 kg/m²   Estimated body mass index is 24.69 kg/m² as calculated from the following:    Height as of this encounter: 67.5\".    Weight as of this encounter: 160 lb (72.6 kg).    Gen: well developed, well nourished, no acute distress  HEENT: normocephalic  Heart; normal S1/S2, regular rate and rhythm  Lungs: clear to auscultation bilaterally  Extremities: no edema, peripheral pulses intact    Neck: supple, full range of motion; no carotid bruits    Fundoscopic Exam: optic discs sharp bilaterally    Neuro:  Mental status:  Orientation: Alert and oriented to person, place, time  Speech Fluent and conversational    CN: PERRL, EOMI with no nystagmus, VFF, smile symmetric, sensation intact, tongue and palate midline, SCM intact, otherwise, CN 2-12 intact  Motor: 5/5 strength throughout, tone normal  DTR: 3+ brisk but symmetric throughout, toes downgoing bilaterally, no clonus  Sensory: intact to light touch throughout; absent to vibration both toes; present but reduced malleoli bilaterally ~3 sec to extinguish  Coord: FNF intact with no tremor or  dysmetria; rapid alternating movements intact bilaterally  Romberg: absent  Gait: normal casual, heel, toe gait but minimally off balance with tandem     Labs:    New    Component      Latest Ref Rng 12/13/2023   Lamotrigine Lvl      2.0 - 20.0 ug/mL 7.6          Prior as noted below    Component      Latest Ref Rng 7/3/2023   Lamotrigine Lvl      2.0 - 20.0 ug/mL 4.1        Prior as noted below      Component      Latest Ref Rng 6/14/2023 6/15/2023   WBC      4.0 - 11.0 x10(3) uL 7.4  8.6    RBC      3.80 - 5.30 x10(6)uL 4.91  4.56    Hemoglobin      12.0 - 16.0 g/dL 14.7  13.7    Hematocrit      35.0 - 48.0 % 45.8  41.8    Platelet Count      150.0 - 450.0 10(3)uL 289.0  263.0    MCV      80.0 - 100.0 fL 93.3  91.7    MCH      26.0 - 34.0 pg 29.9  30.0    MCHC      31.0 - 37.0 g/dL 32.1  32.8    RDW      % 12.9  13.2    Prelim Neutrophil Abs      1.50 - 7.70 x10 (3) uL 5.09  6.39    Neutrophils Absolute      1.50 - 7.70 x10(3) uL 5.09  6.39    Lymphocytes Absolute      1.00 - 4.00 x10(3) uL 1.57  1.23    Monocytes Absolute      0.10 - 1.00 x10(3) uL 0.56  0.87    Eosinophils Absolute      0.00 - 0.70 x10(3) uL 0.09  0.11    Basophils Absolute      0.00 - 0.20 x10(3) uL 0.05  0.02    Immature Granulocyte Absolute      0.00 - 1.00 x10(3) uL 0.01  0.02    Neutrophils %      % 69.1  74.0    Lymphocytes %      % 21.3  14.2    Monocytes %      % 7.6  10.1    Eosinophils %      % 1.2  1.3    Basophils %      % 0.7  0.2    Immature Granulocyte %      % 0.1  0.2    Glucose      70 - 99 mg/dL 134 (H)  114 (H)    Sodium      136 - 145 mmol/L 139  139    Potassium      3.5 - 5.1 mmol/L 3.6  4.7    Chloride      98 - 112 mmol/L 105  108    Carbon Dioxide, Total      21.0 - 32.0 mmol/L 26.0  25.0    ANION GAP      0 - 18 mmol/L 8  6    BUN      7 - 18 mg/dL 15  13    CREATININE      0.55 - 1.02 mg/dL 0.88  0.74    CALCIUM      8.5 - 10.1 mg/dL 9.2  9.3    CALCULATED OSMOLALITY      275 - 295 mOsm/kg 291  289    eGFR-Cr       >=60 mL/min/1.73m2 67  83    AST (SGOT)      15 - 37 U/L 26     ALT (SGPT)      13 - 56 U/L 31     ALKALINE PHOSPHATASE      55 - 142 U/L 88     Total Bilirubin      0.1 - 2.0 mg/dL 1.4     PROTEIN, TOTAL      6.4 - 8.2 g/dL 7.4     Albumin      3.4 - 5.0 g/dL 3.9     Globulin      2.8 - 4.4 g/dL 3.5     A/G Ratio      1.0 - 2.0  1.1     Color Urine      Yellow   Straw    Clarity Urine      Clear   Clear    Spec Gravity      1.001 - 1.030   1.008    Glucose Urine      Negative mg/dL  Negative    Bilirubin Urine      Negative   Negative    Ketones, UA      Negative mg/dL  Negative    Blood Urine      Negative   Moderate !    PH Urine      5.0 - 8.0   5.0    Protein Urine      Negative mg/dL  Negative    Urobilinogen Urine      <2.0 mg/dL  <2.0    Nitrite Urine      Negative   Negative    Leukocyte Esterase       Negative   Negative    WBC Urine      0 - 5 /HPF  1-5    RBC Urine      0 - 2 /HPF  0-2    Bacteria Urine      None Seen /HPF  None Seen    SQUAM EPI CELLS UR      None Seen /HPF  None Seen    RENAL TUBULAR EPITHELIAL CELLS      None Seen /HPF  None Seen    TRANSITIONAL EPI CELLS      None Seen /HPF  None Seen    YEAST URINE      None Seen /HPF  None Seen         Imaging    None new    Prior as noted below      FINDINGS:    MRI BRAIN  FINDINGS:  The ventricles and sulci are within normal limits.  FLAIR signal in periventricular and subcortical white matter is compatible chronic small vessel disease.  Encephalomalacia in left cerebellum is noted.  There is no midline shift or mass effect.  The  basal cisterns are patent.  The gray-white matter differentiation is intact.  The craniocervical junction is unremarkable.       There is no acute intracranial hemorrhage or extra-axial fluid collection identified.  There is no parenchymal signal abnormality identified.  No significant abnormal parenchymal gradient susceptibility.  There is no abnormal parenchymal or  leptomeningeal enhancement.  There is no restricted  diffusion to suggest acute ischemia/infarction.     The visualized paranasal sinuses and mastoid air cells are unremarkable.  The expected major intracranial flow voids are present.        MRA BRAIN  INTRACRANIAL CAROTIDS:         No visible stenosis or aneurysm.  ANTERIOR CEREBRALS:         No visible stenosis or aneurysm.  ANTERIOR COMMUNICATING:  No visible stenosis or aneurysm.  MIDDLE CEREBRALS:         No visible stenosis or aneurysm.  POSTERIOR COMMUNICATING: No visible stenosis or aneurysm.  SUPERIOR CEREBELLARS:         No visible stenosis or aneurysm.  BASILAR:             No visible stenosis or aneurysm.  INTRACRANIAL VERTEBRALS: No visible significant stenosis or dissection.     MRA NECK  COMMON CAROTID:                 Normal for age with no visible significant stenosis or dissection.  CERVICAL INTERNAL CAROTID:  Normal for age with no visible significant stenosis or dissection.  EXTERNAL CAROTID:               Normal for age with no visible significant stenosis or dissection.  CERVICAL VERTEBRAL:               Normal for age with no visible significant stenosis or dissection.                   Impression   CONCLUSION:    1. No acute intracranial abnormality.  2. No acute abnormality on MR angiography of the head.       MRI BRAIN MRA HEAD+MRA NECK (ALL W+WO) (CPT=70553/40452/14878)    Result Date: 6/14/2023  CONCLUSION:  1. No acute intracranial abnormality. 2. No acute abnormality on MR angiography of the head.   Dictated by (CST): Sean López MD on 6/14/2023 at 7:25 PM     Finalized by (CST): Sean López MD on 6/14/2023 at 7:34 PM           CT BRAIN OR HEAD (76249)    Result Date: 6/14/2023  CONCLUSION:  Chronic changes.  No acute disease.   Dictated by (CST): Lei Chaves MD on 6/14/2023 at 11:04 AM     Finalized by (CST): Lei Chaves MD on 6/14/2023 at 11:07 AM       XR SHOULDER, COMPLETE (MIN 2 VIEWS), LEFT (CPT=73030)    Result Date: 6/14/2023  CONCLUSION:  No acute fractures.   Osteoarthritic changes.   Dictated by (CST): Emeka Wang MD on 6/14/2023 at 9:49 AM     Finalized by (CST): Emeka Wang MD on 6/14/2023 at 9:50 AM        Other procedures    None new    Prior as noted below    Reviewed    EEG (6/14/2023)    Background Activity:   The background activity consisted of 9 Hz waveforms, reactive to eye opening/ external stimulation.     Activation:     Hyperventilation:   Not performed.     Photic Stimulation:  Driving response seen.     Sleep:  Stage I sleep seen.      Impression:  Normal EEG. No focal, lateralized or generalized epileptiform activity seen.          Impression/ Plan:  Rossy Rdz is a 79 year old female with PMHx including but not limited to HTN, HLD, who presented to [facility] care 6/27/2023.  Patient was seen by neurology in consultation at hospital but first seen by this author 6/27/2023.     Per discussion with patient as well as chart review, patient presented to ER 6/14/2023, after having a fall and loss of awareness. Patient reportedly was walking her dog and then returned home and daughter noted she was confused and appeared to have bitten her lip.  Patient did not recall going on the walk. MRI brain was done and showed no acute stroke. EEG was normal but given her presentation, she was started on anti-seizure medication with lamotrigine (Lamictal) and slowly titrated up to 50 mg bid.        Currently, she denies any recurrent seizures and denies any events of loss of awareness or auras of odd tastes, smells or sounds. She denies side effects of balance issues, falls, double vision or rash on the lamotrigine but feels drowsy.         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  She denies prior history of stroke but admits she fell down a couple of years ago when walking and states she had injury as a child where she \"cracked her head\" on  the left side of the head; question is raised if this is risk factor for seizures; EEG was normal but given presentation, suspect underlying seizure disorder and will continue Lamictal.  She is now up to 100 mg bid from prior dose of 50 mg bid and doing well- patient advised typical maintenance dose for monotherapy ~200-300 mg / daily and recommend continue 100 mg bid- most recent levels as of 12/13/2023 were in range and she continues to do well      She has some balance issues and decreased vibratory sensation which may be related to neuropathy - check B12 and NCS/EMG; check Lamotrigine levels as well     In addition, patient advised to monitor for subtle signs of seizures and educated on seizure safety.   Patient with no seizures in 6 months and advised ok to drive    1. Seizure (HCC)  As   - Lamotrigine (Lamictal), Serum; Future    2. Decreased vibratory sense  As noted above   - Vitamin B12; Future  - EMG ([facility]); Future    3. Unsteady gait  As noted above   - Vitamin B12; Future  - EMG ([facility] [place]); Future  - Lamotrigine (Lamictal), Serum; Future    Return in about 6 months (around 12/19/2024).    Felix Sam MD, Neurology  [facility]  Pager [phone]  6/19/2024

## 2024-06-19 NOTE — PATIENT INSTRUCTIONS
Refill policies:    Allow 2-3 business days for refills; controlled substances may take longer.  Contact your pharmacy at least 5 days prior to running out of medication and have them send an electronic request or submit request through the “request refill” option in your [employer] account.  Refills are not addressed on weekends; covering physicians do not authorize routine medications on weekends.  No narcotics or controlled substances are refilled after noon on Fridays or by on call physicians.  By law, narcotics must be electronically prescribed.  A 30 day supply with no refills is the maximum allowed.  If your prescription is due for a refill, you may be due for a follow up appointment.  To best provide you care, patients receiving routine medications need to be seen at least once a year.  Patients receiving narcotic/controlled substance medications need to be seen at least once every 3 months.  In the event that your preferred pharmacy does not have the requested medication in stock (e.g. Backordered), it is your responsibility to find another pharmacy that has the requested medication available.  We will gladly send a new prescription to that pharmacy at your request.    Scheduling Tests:    If your physician has ordered radiology tests such as MRI or CT scans, please contact Central Scheduling at [phone] right away to schedule the test.  Once scheduled, the [facility] Centralized Referral Team will work with your insurance carrier to obtain pre-certification or prior authorization.  Depending on your insurance carrier, approval may take 3-10 days.  It is highly recommended patients assure they have received an authorization before having a test performed.  If test is done without insurance authorization, patient may be responsible for the entire amount billed.      Precertification and Prior Authorizations:  If your physician has recommended that you have a procedure or additional testing performed the [facility]  Centralized Referral Team will contact your insurance carrier to obtain pre-certification or prior authorization.    You are strongly encouraged to contact your insurance carrier to verify that your procedure/test has been approved and is a COVERED benefit.  Although the [facility] Centralized Referral Team does its due diligence, the insurance carrier gives the disclaimer that \"Although the procedure is authorized, this does not guarantee payment.\"    Ultimately the patient is responsible for payment.   Thank you for your understanding in this matter.  Paperwork Completion:  If you require FMLA or disability paperwork for your recovery, please make sure to either drop it off or have it faxed to our office at [phone]. Be sure the form has your name and date of birth on it.  The form will be faxed to our Forms Department and they will complete it for you.  There is a 25$ fee for all forms that need to be filled out.  Please be aware there is a 10-14 day turnaround time.  You will need to sign a release of information (GEOVANNY) form if your paperwork does not come with one.  You may call the Forms Department with any questions at [phone].  Their fax number is [phone].

## 2024-06-21 LAB — LAMOTRIGINE LVL: 7.7 UG/ML

## 2024-07-15 ENCOUNTER — PROCEDURE VISIT (OUTPATIENT)
Dept: NEUROLOGY | Facility: CLINIC | Age: 79
End: 2024-07-15
Payer: MEDICARE

## 2024-07-15 DIAGNOSIS — R20.8 DECREASED VIBRATORY SENSE: ICD-10-CM

## 2024-07-15 DIAGNOSIS — R26.81 UNSTEADY GAIT: ICD-10-CM

## 2024-07-15 NOTE — PROCEDURES
[address] [place] [address]   [phone]        Full Name: Rossy Rdz Gender: Female  Patient ID: [id number] YOB: 1945      Visit Date: 7/15/2024 10:58 AM  Age: 79 Years  Examining Physician: Felix Sam  Height: 5 feet 7 inch  Weight: 160 lbs  History:     Focused HPI:   This is a 79 year old female, with PMHx including but not limited to HTN, HLD, who presents for evaluation of balance issues and decreased vibratory sensation; NCS/EMG requested to evaluate for neuropathy and/or type/severity.     Focused Exam:  Motor: 5/5 strength throughout, tone normal  DTR: 3+ brisk but symmetric throughout, toes downgoing bilaterally, no clonus  Sensory: intact to light touch throughout; absent to vibration both toes; present but reduced malleoli bilaterally ~3 sec to extinguish      Sensory NCS      Nerve / Sites Rec. Site Onset Lat Peak Lat NP Amp PP Amp Segments Distance Velocity Comment     ms ms µV µV  cm m/s    R Sural - (Antidromic)      Calf Ankle 3.07 3.96 13.7 15.7 Calf - Ankle 13 42       Ref.  ?3.60 ?4.50 ?4.0 ?4.0 Ref.         2 Ankle 3.02 3.91 13.1 18.6       L Sural - (Antidromic)      1 Ankle 3.54 4.43 13.7 20.2           Motor NCS      Nerve / Sites Muscle Latency Amplitude Segments Dist. Lat Diff Velocity Comments     ms mV  cm ms m/s    R Peroneal - EDB      Ankle EDB 4.46 5.3 Ankle - EDB 7         Ref.  ?6.50 ?1.1 Ref.          B. Fib Head EDB 12.40 4.6 B. Fib Head - Ankle 34 7.94 42.8       Ref.    Ref.   ?36.0    L Peroneal - EDB      Ankle EDB 4.06 5.2 Ankle - EDB 8         Ref.  ?6.50 ?1.1 Ref.          B. Fib Head EDB 12.73 3.8 B. Fib Head - Ankle 35.5 8.67 41.0       Ref.    Ref.   ?36.0    R Tibial - AH      Ankle AH 3.88 14.6 Ankle - AH 8         Ref.  ?6.10 ?1.1 Ref.          Knee AH 14.00 7.6 Knee - Ankle 40 10.13 39.5       Ref.    Ref.   ?34.0    L Tibial - AH      Ankle AH 3.96 17.6 Ankle - AH 8          Ref.  ?6.10 ?1.1 Ref.          Knee AH 14.23 9.8 Knee - Ankle 42 10.27 40.9       Ref.    Ref.   ?34.0        F  Wave      Nerve M Latency F Latency    ms ms   R Peroneal - EDB 4.1 53.5   Ref.  ?63.6   R Tibial - AH 4.4 56.5   Ref.  ?61.1   L Tibial - AH 4.8 57.9   Ref.  ?61.1   L Peroneal - EDB 3.9 55.1   Ref.  ?63.6       EMG Summary Table     Spontaneous MUAP Recruitment   Muscle IA Fib PSW Fasc H.F. Amp Dur. PPP Pattern   R. Vastus medialis N None None None None N N N N   L. Vastus medialis N None None None None N N N N   R. Peroneus longus N None None None None N N N N   L. Peroneus longus N None None None None N N N N   R. Tibialis anterior N None None None None N N N N   L. Tibialis anterior N None None None None N N N N   R. Gastrocnemius N None None None None N N N N   L. Gastrocnemius N None None None None N N N N   R. Extensor hallucis longus N None None None None N N N N   L. Extensor hallucis longus N None None None None N N N N       Summary    The motor conduction test was normal in all 4 of the tested nerves: R Peroneal - EDB, L Peroneal - EDB, R Tibial - AH, L Tibial - AH.    The sensory conduction test was performed on 2 nerve(s). The results were normal in 1 nerve(s): R Sural - (Antidromic). Findings were unremarkable in 1 nerve(s): L Sural - (Antidromic). There were no results outside the specified normal range.      The F wave study was unremarkable in all 4 of the tested nerves: R Peroneal - EDB, R Tibial - AH, L Tibial - AH, L Peroneal - EDB    The needle EMG study was normal in all 10 tested muscles: R. Vastus medialis, L. Vastus medialis, R. Peroneus longus, L. Peroneus longus, R. Tibialis anterior, L. Tibialis anterior, R. Gastrocnemius, L. Gastrocnemius, R. Extensor hallucis longus, L. Extensor hallucis longus.        Conclusion:   This is a normal study. There is no electrophysiologic evidence to suggest an underlying large fiber polyneuropathy, primary demyelinating neuropathy or  myopathy. Of note, this test would not rule out a small fiber neuropathy or central etiology. Clinical correlation is recommended.     Felix Sam MD, Neurology  [facility]  Pager [phone]  7/15/2024

## 2024-07-15 NOTE — PATIENT INSTRUCTIONS
Refill policies:    Allow 2-3 business days for refills; controlled substances may take longer.  Contact your pharmacy at least 5 days prior to running out of medication and have them send an electronic request or submit request through the “request refill” option in your [employer] account.  Refills are not addressed on weekends; covering physicians do not authorize routine medications on weekends.  No narcotics or controlled substances are refilled after noon on Fridays or by on call physicians.  By law, narcotics must be electronically prescribed.  A 30 day supply with no refills is the maximum allowed.  If your prescription is due for a refill, you may be due for a follow up appointment.  To best provide you care, patients receiving routine medications need to be seen at least once a year.  Patients receiving narcotic/controlled substance medications need to be seen at least once every 3 months.  In the event that your preferred pharmacy does not have the requested medication in stock (e.g. Backordered), it is your responsibility to find another pharmacy that has the requested medication available.  We will gladly send a new prescription to that pharmacy at your request.    Scheduling Tests:    If your physician has ordered radiology tests such as MRI or CT scans, please contact Central Scheduling at [phone] right away to schedule the test.  Once scheduled, the [facility] Centralized Referral Team will work with your insurance carrier to obtain pre-certification or prior authorization.  Depending on your insurance carrier, approval may take 3-10 days.  It is highly recommended patients assure they have received an authorization before having a test performed.  If test is done without insurance authorization, patient may be responsible for the entire amount billed.      Precertification and Prior Authorizations:  If your physician has recommended that you have a procedure or additional testing performed the [facility]  Centralized Referral Team will contact your insurance carrier to obtain pre-certification or prior authorization.    You are strongly encouraged to contact your insurance carrier to verify that your procedure/test has been approved and is a COVERED benefit.  Although the [facility] Centralized Referral Team does its due diligence, the insurance carrier gives the disclaimer that \"Although the procedure is authorized, this does not guarantee payment.\"    Ultimately the patient is responsible for payment.   Thank you for your understanding in this matter.  Paperwork Completion:  If you require FMLA or disability paperwork for your recovery, please make sure to either drop it off or have it faxed to our office at [phone]. Be sure the form has your name and date of birth on it.  The form will be faxed to our Forms Department and they will complete it for you.  There is a 25$ fee for all forms that need to be filled out.  Please be aware there is a 10-14 day turnaround time.  You will need to sign a release of information (GEOVANNY) form if your paperwork does not come with one.  You may call the Forms Department with any questions at [phone].  Their fax number is [phone].

## 2024-09-05 DIAGNOSIS — R56.9 SEIZURE (HCC): Primary | ICD-10-CM

## 2024-09-06 RX ORDER — LAMOTRIGINE 100 MG/1
100 TABLET ORAL 2 TIMES DAILY
Qty: 180 TABLET | Refills: 1 | Status: SHIPPED | OUTPATIENT
Start: 2024-09-06

## 2024-09-06 NOTE — TELEPHONE ENCOUNTER
Medication: lamoTRIgine 100 MG Oral Tab      Date of last refill: 3/18/24 (#180/1)  [place] 6/11/24     Last office visit: 6/19/24  Due back to clinic per last office note:  Return in about 6 months   Date next office visit scheduled:    Future Appointments   Date Time Provider Department Center   12/18/2024 11:00 AM Felix Sam MD [facility]           Last OV note recommendation:    Impression/ Plan:  Rossy Rdz is a 79 year old female with PMHx including but not limited to HTN, HLD, who presented to [facility]/20[facility].  Patient was seen by neurology in consultation at hospital but first seen by this author 6/27/2023.     Per discussion with patient as well as chart review, patient presented to ER 6/14/2023, after having a fall and loss of awareness. Patient reportedly was walking her dog and then returned home and daughter noted she was confused and appeared to have bitten her lip.  Patient did not recall going on the walk. MRI brain was done and showed no acute stroke. EEG was normal but given her presentation, she was started on anti-seizure medication with lamotrigine (Lamictal) and slowly titrated up to 50 mg bid.         Currently, she denies any recurrent seizures and denies any events of loss of awareness or auras of odd tastes, smells or sounds. She denies side effects of balance issues, falls, double vision or rash on the lamotrigine but feels drowsy.          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  She denies prior history of stroke but admits she fell down a couple of years ago when walking and states she had injury as a child where she \"cracked her head\" on the left side of the head; question is raised if this is risk factor for seizures; EEG was normal but given presentation, suspect underlying seizure disorder and will  continue Lamictal.  She is now up to 100 mg bid from prior dose of 50 mg bid and doing well- patient advised typical maintenance dose for monotherapy ~200-300 mg / daily and recommend continue 100 mg bid- most recent levels as of 12/13/2023 were in range and she continues to do well       She has some balance issues and decreased vibratory sensation which may be related to neuropathy - check B12 and NCS/EMG; check Lamotrigine levels as well      In addition, patient advised to monitor for subtle signs of seizures and educated on seizure safety.   Patient with no seizures in 6 months and advised ok to drive     1. Seizure (HCC)  As   - Lamotrigine (Lamictal), Serum; Future     2. Decreased vibratory sense  As noted above   - Vitamin B12; Future  - [place] ([facility] [place]); Future     3. Unsteady gait  As noted above   - Vitamin B12; Future  - [facility] ([facility]); Future  - Lamotrigine (Lamictal), Serum; Future     Return in about 6 months (around 12/19/2024).     Felix Sam MD, Neurology

## 2024-12-18 ENCOUNTER — OFFICE VISIT (OUTPATIENT)
Dept: NEUROLOGY | Facility: CLINIC | Age: 79
End: 2024-12-18
Payer: MEDICARE

## 2024-12-18 ENCOUNTER — LAB ENCOUNTER (OUTPATIENT)
Dept: LAB | Age: 79
End: 2024-12-18
Attending: Other
Payer: MEDICARE

## 2024-12-18 VITALS
DIASTOLIC BLOOD PRESSURE: 86 MMHG | RESPIRATION RATE: 16 BRPM | HEIGHT: 67.5 IN | WEIGHT: 160 LBS | SYSTOLIC BLOOD PRESSURE: 160 MMHG | HEART RATE: 80 BPM | BODY MASS INDEX: 24.82 KG/M2

## 2024-12-18 DIAGNOSIS — R56.9 SEIZURE (HCC): ICD-10-CM

## 2024-12-18 DIAGNOSIS — R56.9 SEIZURE (HCC): Primary | ICD-10-CM

## 2024-12-18 PROCEDURE — 99213 OFFICE O/P EST LOW 20 MIN: CPT | Performed by: OTHER

## 2024-12-18 PROCEDURE — 80175 DRUG SCREEN QUAN LAMOTRIGINE: CPT

## 2024-12-18 PROCEDURE — 36415 COLL VENOUS BLD VENIPUNCTURE: CPT

## 2024-12-18 NOTE — PROGRESS NOTES
[facility] Progress Note    HPI  Chief Complaint   Patient presents with    Seizures     F/U, reports no recent events/auras/seizure-like Sx's       Initial notes as per 6/27/2023:   \"    Rossy Rdz is a 79 year old female with PMHx including but not limited to HTN, HLD, who presents to establish care.  Patient was recently seen by neurology in consultation at hospital but this is patient's first visit to this author.      Per discussion with patient as well as chart review, patient presented to ER 6/14/2023, after having a fall and loss of awareness. Patient reportedly was walking her dog and then returned home and daughter noted she was confused and appeared to have bitten her lip.  Patient did not recall going on the walk. MRI brain was done and showed no acute stroke. EEG was normal but given her presentation, she was started on anti-seizure medication with lamotrigine (Lamictal) and has been slowly titrated up to current dose of 50 mg bid.        Currently, she denies any recurrent seizures and denies any events of loss of awareness or auras of odd tastes, smells or sounds. She denies side effects balance issues, falls, double vision or rash on the lamotrigine but feels drowsy.       She denies prior history of stroke but admits she fell down a couple of years ago when walking and states she had injury as a child where she \"cracked her head\" on the left side of the head; she denies episodes of loss of awareness otherwise but has had several falls.     Otherwise, patient denies any recent weight change, fevers, chills, nausea, double vision/ blurry vision / loss of vision, chest pain, palpitations, shortness of breath, rashes, joint pains, bowel / bladder incontinence or mood issues. \"       Prior notes as per 9/27/2023.  Patient last seen 6/27/2023.  Since last visit, she has remained on Lamictal 50 mg bid. She denies any seizures or episodes of loss of awareness or auras of odd  tastes, smells or sounds; no side effects of balance issues, double vision, rash or falls. She admits to some \"brain fog\" episodes but no loss of awareness.  She has not had any additional head trauma.  She denies waking up in AM having wet the bed or bitten her tongue.         Prior notes as per 12/12/2023.  Patient last seen 9/27/2023.  Since last visit, she has been increased on Lamictal from 50 mg bid up to 100 mg bid. On this dose, she denies any seizures or episodes of loss of awareness or auras of odd tastes, smells or sounds; no side effects of balance issues, double vision, rash or falls.     She denies waking up in AM having wet the bed or bitten her tongue.      Prior notes as per 6/19/2024.  Patient last seen 12/12/2023.  She has been doing well overall and remains on Lamictal 100 mg bid.  She did have a rash ~1/3/2024, which was on right lower back above hip but did not progress to mucosal surfaces or hands and resolved with anti-fungal cream; she denies rash since this time and denies any seizures or episodes of loss of awareness or auras of odd tastes, smells or sounds; no side effects of double vision, rash or falls, but has some balance issues.     She denies waking up in AM having wet the bed or bitten her tongue.       Patient last seen 6/19/2024.   She remains on Lamictal 100 mg bid and denies any seizures or episodes of loss of awareness or auras of odd tastes, smells or sounds; no side effects of double vision, rash or falls, but has some balance issues; denies worsening.     She denies waking up in AM having wet the bed or bitten her tongue.         Past Medical History:    Elevated glucose    High blood pressure    High cholesterol    Hyperlipidemia    Hypertension     Past Surgical History:   Procedure Laterality Date    Excis lumbar disk,one level      Removal of tonsils,12+ y/o       Family History   Problem Relation Age of Onset    Cancer Father         stomach CA    Cancer Mother          breast CA     Social History     Socioeconomic History    Marital status:     Number of children: 3   Occupational History    Occupation: Retired    Tobacco Use    Smoking status: Former     Passive exposure: Never    Smokeless tobacco: Never   Vaping Use    Vaping status: Never Used   Substance and Sexual Activity    Alcohol use: No     Alcohol/week: 0.0 standard drinks of alcohol    Drug use: No   Other Topics Concern    Caffeine Concern Yes     Comment: 1-2 diet cokes a day    Exercise No       No Known Allergies      Current Outpatient Medications:     LAMOTRIGINE 100 MG Oral Tab, TAKE 1 TABLET(100 MG) BY MOUTH TWICE DAILY, Disp: 180 tablet, Rfl: 1    atorvastatin 10 MG Oral Tab, TAKE 1 TABLET BY MOUTH EVERYDAY AT BEDTIME, Disp: 90 tablet, Rfl: 2    Fluticasone Propionate (FLONASE NA), by Nasal route., Disp: , Rfl:     metoprolol succinate 25 MG Oral Tablet 24 Hr, Take 1 tablet (25 mg total) by mouth daily., Disp: 90 tablet, Rfl: 3    MULTIVITAMINS OR TABS, 1 TABLET DAILY, Disp: , Rfl:     Review of Systems:  No chest pain or palpitations; otherwise as noted in HPI.    Exam:  /86 (BP Location: Left arm, Patient Position: Sitting, Cuff Size: adult)   Pulse 80   Resp 16   Ht 67.5\"   Wt 160 lb (72.6 kg)   BMI 24.69 kg/m²   Estimated body mass index is 24.69 kg/m² as calculated from the following:    Height as of this encounter: 67.5\".    Weight as of this encounter: 160 lb (72.6 kg).    Gen: well developed, well nourished, no acute distress  HEENT: normocephalic  Heart; normal S1/S2, regular rate and rhythm  Lungs: clear to auscultation bilaterally  Extremities: no edema, peripheral pulses intact    Neck: supple, full range of motion; no carotid bruits    Fundoscopic Exam: optic discs sharp bilaterally    Neuro:  Mental status:  Orientation: Alert and oriented to person, place, time  Speech Fluent and conversational    CN: PERRL, EOMI with no nystagmus, VFF, smile symmetric,  sensation intact, tongue and palate midline, SCM intact, otherwise, CN 2-12 intact  Motor: 5/5 strength throughout, tone normal  DTR: 3+ brisk but symmetric throughout, toes downgoing bilaterally, no clonus  Sensory: intact to light touch throughout; present but reduced at toes ~3 sec to extinguish bilaterally  Coord: FNF intact with no tremor or dysmetria; rapid alternating movements intact bilaterally  Romberg: absent  Gait: normal casual, heel, toe gait but minimally off balance with tandem     Labs:    New    Component      Latest Ref Rng 6/19/2024   Vitamin B12      193 - 986 pg/mL 447    Lamotrigine Lvl      2.0 - 20.0 ug/mL 7.7      Prior as noted below    Component      Latest Ref Rng 12/13/2023   Lamotrigine Lvl      2.0 - 20.0 ug/mL 7.6          Prior as noted below    Component      Latest Ref Rng 7/3/2023   Lamotrigine Lvl      2.0 - 20.0 ug/mL 4.1        Prior as noted below      Component      Latest Ref Rng 6/14/2023 6/15/2023   WBC      4.0 - 11.0 x10(3) uL 7.4  8.6    RBC      3.80 - 5.30 x10(6)uL 4.91  4.56    Hemoglobin      12.0 - 16.0 g/dL 14.7  13.7    Hematocrit      35.0 - 48.0 % 45.8  41.8    Platelet Count      150.0 - 450.0 10(3)uL 289.0  263.0    MCV      80.0 - 100.0 fL 93.3  91.7    MCH      26.0 - 34.0 pg 29.9  30.0    MCHC      31.0 - 37.0 g/dL 32.1  32.8    RDW      % 12.9  13.2    Prelim Neutrophil Abs      1.50 - 7.70 x10 (3) uL 5.09  6.39    Neutrophils Absolute      1.50 - 7.70 x10(3) uL 5.09  6.39    Lymphocytes Absolute      1.00 - 4.00 x10(3) uL 1.57  1.23    Monocytes Absolute      0.10 - 1.00 x10(3) uL 0.56  0.87    Eosinophils Absolute      0.00 - 0.70 x10(3) uL 0.09  0.11    Basophils Absolute      0.00 - 0.20 x10(3) uL 0.05  0.02    Immature Granulocyte Absolute      0.00 - 1.00 x10(3) uL 0.01  0.02    Neutrophils %      % 69.1  74.0    Lymphocytes %      % 21.3  14.2    Monocytes %      % 7.6  10.1    Eosinophils %      % 1.2  1.3    Basophils %      % 0.7  0.2     Immature Granulocyte %      % 0.1  0.2    Glucose      70 - 99 mg/dL 134 (H)  114 (H)    Sodium      136 - 145 mmol/L 139  139    Potassium      3.5 - 5.1 mmol/L 3.6  4.7    Chloride      98 - 112 mmol/L 105  108    Carbon Dioxide, Total      21.0 - 32.0 mmol/L 26.0  25.0    ANION GAP      0 - 18 mmol/L 8  6    BUN      7 - 18 mg/dL 15  13    CREATININE      0.55 - 1.02 mg/dL 0.88  0.74    CALCIUM      8.5 - 10.1 mg/dL 9.2  9.3    CALCULATED OSMOLALITY      275 - 295 mOsm/kg 291  289    eGFR-Cr      >=60 mL/min/1.73m2 67  83    AST (SGOT)      15 - 37 U/L 26     ALT (SGPT)      13 - 56 U/L 31     ALKALINE PHOSPHATASE      55 - 142 U/L 88     Total Bilirubin      0.1 - 2.0 mg/dL 1.4     PROTEIN, TOTAL      6.4 - 8.2 g/dL 7.4     Albumin      3.4 - 5.0 g/dL 3.9     Globulin      2.8 - 4.4 g/dL 3.5     A/G Ratio      1.0 - 2.0  1.1     Color Urine      Yellow   Straw    Clarity Urine      Clear   Clear    Spec Gravity      1.001 - 1.030   1.008    Glucose Urine      Negative mg/dL  Negative    Bilirubin Urine      Negative   Negative    Ketones, UA      Negative mg/dL  Negative    Blood Urine      Negative   Moderate !    PH Urine      5.0 - 8.0   5.0    Protein Urine      Negative mg/dL  Negative    Urobilinogen Urine      <2.0 mg/dL  <2.0    Nitrite Urine      Negative   Negative    Leukocyte Esterase       Negative   Negative    WBC Urine      0 - 5 /HPF  1-5    RBC Urine      0 - 2 /HPF  0-2    Bacteria Urine      None Seen /HPF  None Seen    SQUAM EPI CELLS UR      None Seen /HPF  None Seen    RENAL TUBULAR EPITHELIAL CELLS      None Seen /HPF  None Seen    TRANSITIONAL EPI CELLS      None Seen /HPF  None Seen    YEAST URINE      None Seen /HPF  None Seen         Imaging    None new    Prior as noted below      FINDINGS:    MRI BRAIN  FINDINGS:  The ventricles and sulci are within normal limits.  FLAIR signal in periventricular and subcortical white matter is compatible chronic small vessel disease.   Encephalomalacia in left cerebellum is noted.  There is no midline shift or mass effect.  The  basal cisterns are patent.  The gray-white matter differentiation is intact.  The craniocervical junction is unremarkable.       There is no acute intracranial hemorrhage or extra-axial fluid collection identified.  There is no parenchymal signal abnormality identified.  No significant abnormal parenchymal gradient susceptibility.  There is no abnormal parenchymal or  leptomeningeal enhancement.  There is no restricted diffusion to suggest acute ischemia/infarction.     The visualized paranasal sinuses and mastoid air cells are unremarkable.  The expected major intracranial flow voids are present.        MRA BRAIN  INTRACRANIAL CAROTIDS:         No visible stenosis or aneurysm.  ANTERIOR CEREBRALS:         No visible stenosis or aneurysm.  ANTERIOR COMMUNICATING:  No visible stenosis or aneurysm.  MIDDLE CEREBRALS:         No visible stenosis or aneurysm.  POSTERIOR COMMUNICATING: No visible stenosis or aneurysm.  SUPERIOR CEREBELLARS:         No visible stenosis or aneurysm.  BASILAR:             No visible stenosis or aneurysm.  INTRACRANIAL VERTEBRALS: No visible significant stenosis or dissection.     MRA NECK  COMMON CAROTID:                 Normal for age with no visible significant stenosis or dissection.  CERVICAL INTERNAL CAROTID:  Normal for age with no visible significant stenosis or dissection.  EXTERNAL CAROTID:               Normal for age with no visible significant stenosis or dissection.  CERVICAL VERTEBRAL:               Normal for age with no visible significant stenosis or dissection.                   Impression   CONCLUSION:    1. No acute intracranial abnormality.  2. No acute abnormality on MR angiography of the head.       MRI BRAIN MRA HEAD+MRA NECK (ALL W+WO) (CPT=70553/66277/60093)    Result Date: 6/14/2023  CONCLUSION:  1. No acute intracranial abnormality. 2. No acute abnormality on MR  angiography of the head.   Dictated by (CST): Sean López MD on 6/14/2023 at 7:25 PM     Finalized by (CST): Sean López MD on 6/14/2023 at 7:34 PM           CT BRAIN OR HEAD (66745)    Result Date: 6/14/2023  CONCLUSION:  Chronic changes.  No acute disease.   Dictated by (CST): Lei Chaves MD on 6/14/2023 at 11:04 AM     Finalized by (CST): Lei Chaves MD on 6/14/2023 at 11:07 AM       XR SHOULDER, COMPLETE (MIN 2 VIEWS), LEFT (CPT=73030)    Result Date: 6/14/2023  CONCLUSION:  No acute fractures.  Osteoarthritic changes.   Dictated by (CST): Emeka Wang MD on 6/14/2023 at 9:49 AM     Finalized by (CST): Emeka Wang MD on 6/14/2023 at 9:50 AM        Other procedures    New    NCS/EMG (7/15/2024):       Sensory NCS      Nerve / Sites Rec. Site Onset Lat Peak Lat NP Amp PP Amp Segments Distance Velocity Comment       ms ms µV µV   cm m/s     R Sural - (Antidromic)      Calf Ankle 3.07 3.96 13.7 15.7 Calf - Ankle 13 42        Ref.   <=3.60 <=4.50 >=4.0 >=4.0 Ref.            2 Ankle 3.02 3.91 13.1 18.6           L Sural - (Antidromic)      1 Ankle 3.54 4.43 13.7 20.2               Motor NCS      Nerve / Sites Muscle Latency Amplitude Segments Dist. Lat Diff Velocity Comments       ms mV   cm ms m/s     R Peroneal - EDB      Ankle EDB 4.46 5.3 Ankle - EDB 7            Ref.   <=6.50 >=1.1 Ref.              B. Fib Head EDB 12.40 4.6 B. Fib Head - Ankle 34 7.94 42.8        Ref.       Ref.     >=36.0     L Peroneal - EDB      Ankle EDB 4.06 5.2 Ankle - EDB 8            Ref.   <=6.50 >=1.1 Ref.              B. Fib Head EDB 12.73 3.8 B. Fib Head - Ankle 35.5 8.67 41.0        Ref.       Ref.     >=36.0     R Tibial - AH      Ankle AH 3.88 14.6 Ankle - AH 8            Ref.   <=6.10 >=1.1 Ref.              Knee AH 14.00 7.6 Knee - Ankle 40 10.13 39.5        Ref.       Ref.     >=34.0     L Tibial - AH      Ankle AH 3.96 17.6 Ankle - AH 8            Ref.   <=6.10 >=1.1 Ref.              Knee AH 14.23  9.8 Knee - Ankle 42 10.27 40.9        Ref.       Ref.     >=34.0         F  Wave      Nerve M Latency F Latency     ms ms   R Peroneal - EDB 4.1 53.5   Ref.   <=63.6   R Tibial - AH 4.4 56.5   Ref.   <=61.1   L Tibial - AH 4.8 57.9   Ref.   <=61.1   L Peroneal - EDB 3.9 55.1   Ref.   <=63.6                   EMG Summary Table       Spontaneous MUAP Recruitment   Muscle IA Fib PSW Fasc H.F. Amp Dur. PPP Pattern   R. Vastus medialis N None None None None N N N N   L. Vastus medialis N None None None None N N N N   R. Peroneus longus N None None None None N N N N   L. Peroneus longus N None None None None N N N N   R. Tibialis anterior N None None None None N N N N   L. Tibialis anterior N None None None None N N N N   R. Gastrocnemius N None None None None N N N N   L. Gastrocnemius N None None None None N N N N   R. Extensor hallucis longus N None None None None N N N N   L. Extensor hallucis longus N None None None None N N N N       Summary     The motor conduction test was normal in all 4 of the tested nerves: R Peroneal - EDB, L Peroneal - EDB, R Tibial - AH, L Tibial - AH.     The sensory conduction test was performed on 2 nerve(s). The results were normal in 1 nerve(s): R Sural - (Antidromic). Findings were unremarkable in 1 nerve(s): L Sural - (Antidromic). There were no results outside the specified normal range.        The F wave study was unremarkable in all 4 of the tested nerves: R Peroneal - EDB, R Tibial - AH, L Tibial - AH, L Peroneal - EDB     The needle EMG study was normal in all 10 tested muscles: R. Vastus medialis, L. Vastus medialis, R. Peroneus longus, L. Peroneus longus, R. Tibialis anterior, L. Tibialis anterior, R. Gastrocnemius, L. Gastrocnemius, R. Extensor hallucis longus, L. Extensor hallucis longus.         Conclusion:   This is a normal study. There is no electrophysiologic evidence to suggest an underlying large fiber polyneuropathy, primary demyelinating neuropathy or myopathy. Of  note, this test would not rule out a small fiber neuropathy or central etiology. Clinical correlation is recommended.     Prior as noted below    Reviewed    EEG (6/14/2023)    Background Activity:   The background activity consisted of 9 Hz waveforms, reactive to eye opening/ external stimulation.     Activation:     Hyperventilation:   Not performed.     Photic Stimulation:  Driving response seen.     Sleep:  Stage I sleep seen.      Impression:  Normal EEG. No focal, lateralized or generalized epileptiform activity seen.          Impression/ Plan:  Rossy Rdz is a 79 year old female with PMHx including but not limited to HTN, HLD, who presented to [facility] 6/27/2023.  Patient was seen by neurology in consultation at hospital but first seen by this author 6/27/2023.     Per discussion with patient as well as chart review, patient presented to ER 6/14/2023, after having a fall and loss of awareness. Patient reportedly was walking her dog and then returned home and daughter noted she was confused and appeared to have bitten her lip.  Patient did not recall going on the walk. MRI brain was done and showed no acute stroke. EEG was normal but given her presentation, she was started on anti-seizure medication with lamotrigine (Lamictal) and slowly titrated up to 50 mg bid.        Currently, she denies any recurrent seizures and denies any events of loss of awareness or auras of odd tastes, smells or sounds. She denies side effects of balance issues, falls, double vision or rash on the lamotrigine but feels drowsy.         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  She denies prior history of stroke but admits she fell down a couple of years ago when walking and states she had injury as a child where she \"cracked her head\" on the left side of the head; question is raised if this is risk factor for  seizures; EEG was normal but given presentation, suspect underlying seizure disorder and will continue Lamictal.  She is now up to 100 mg bid from prior dose of 50 mg bid and doing well- patient advised typical maintenance dose for monotherapy ~200-300 mg / daily and recommend continue 100 mg bid- most recent levels as of 12/13/2023 were in range and she continues to do well      She has some balance issues and decreased vibratory sensation - Lamotrigine levels normal and NCS/EMG was normal with no evidence of large fiber polyneuropathy thus far - will repeat levels      In addition, patient advised to monitor for subtle signs of seizures and educated on seizure safety.   Patient with no seizures in 6 months and advised ok to drive    1. Seizure (HCC)  As noted above   - Lamotrigine (Lamictal), Serum; Future    Return in about 6 months (around 6/18/2025).    Felix Sam MD, Neurology  [facility]  Pager [phone]  12/18/2024

## 2024-12-18 NOTE — PATIENT INSTRUCTIONS
Refill policies:    Allow 2-3 business days for refills; controlled substances may take longer.  Contact your pharmacy at least 5 days prior to running out of medication and have them send an electronic request or submit request through the “request refill” option in your [employer] account.  Refills are not addressed on weekends; covering physicians do not authorize routine medications on weekends.  No narcotics or controlled substances are refilled after noon on Fridays or by on call physicians.  By law, narcotics must be electronically prescribed.  A 30 day supply with no refills is the maximum allowed.  If your prescription is due for a refill, you may be due for a follow up appointment.  To best provide you care, patients receiving routine medications need to be seen at least once a year.  Patients receiving narcotic/controlled substance medications need to be seen at least once every 3 months.  In the event that your preferred pharmacy does not have the requested medication in stock (e.g. Backordered), it is your responsibility to find another pharmacy that has the requested medication available.  We will gladly send a new prescription to that pharmacy at your request.    Scheduling Tests:    If your physician has ordered radiology tests such as MRI or CT scans, please contact Central Scheduling at [phone] right away to schedule the test.  Once scheduled, the [facility] Centralized Referral Team will work with your insurance carrier to obtain pre-certification or prior authorization.  Depending on your insurance carrier, approval may take 3-10 days.  It is highly recommended patients assure they have received an authorization before having a test performed.  If test is done without insurance authorization, patient may be responsible for the entire amount billed.      Precertification and Prior Authorizations:  If your physician has recommended that you have a procedure or additional testing performed the [facility]  Centralized Referral Team will contact your insurance carrier to obtain pre-certification or prior authorization.    You are strongly encouraged to contact your insurance carrier to verify that your procedure/test has been approved and is a COVERED benefit.  Although the [facility] Centralized Referral Team does its due diligence, the insurance carrier gives the disclaimer that \"Although the procedure is authorized, this does not guarantee payment.\"    Ultimately the patient is responsible for payment.   Thank you for your understanding in this matter.  Paperwork Completion:  If you require FMLA or disability paperwork for your recovery, please make sure to either drop it off or have it faxed to our office at [phone]. Be sure the form has your name and date of birth on it.  The form will be faxed to our Forms Department and they will complete it for you.  There is a 25$ fee for all forms that need to be filled out.  Please be aware there is a 10-14 day turnaround time.  You will need to sign a release of information (GEOVANNY) form if your paperwork does not come with one.  You may call the Forms Department with any questions at [phone].  Their fax number is [phone].

## 2024-12-20 LAB — LAMOTRIGINE LVL: 6.2 UG/ML

## 2025-02-02 ENCOUNTER — WALK IN (OUTPATIENT)
Dept: URGENT CARE | Age: 80
End: 2025-02-02
Attending: INTERNAL MEDICINE

## 2025-02-02 VITALS
RESPIRATION RATE: 20 BRPM | SYSTOLIC BLOOD PRESSURE: 172 MMHG | OXYGEN SATURATION: 97 % | DIASTOLIC BLOOD PRESSURE: 85 MMHG | HEART RATE: 84 BPM | TEMPERATURE: 98.1 F

## 2025-02-02 DIAGNOSIS — J10.1 INFLUENZA A: Primary | ICD-10-CM

## 2025-02-02 DIAGNOSIS — J02.9 SORE THROAT: ICD-10-CM

## 2025-02-02 DIAGNOSIS — I10 WHITE COAT SYNDROME WITH DIAGNOSIS OF HYPERTENSION: ICD-10-CM

## 2025-02-02 DIAGNOSIS — R05.1 ACUTE COUGH: ICD-10-CM

## 2025-02-02 LAB
FLUAV AG UPPER RESP QL IA.RAPID: POSITIVE
FLUBV AG UPPER RESP QL IA.RAPID: NEGATIVE
INTERNAL PROCEDURAL CONTROLS ACCEPTABLE: YES
S PYO AG THROAT QL IA.RAPID: NEGATIVE
SARS-COV+SARS-COV-2 AG RESP QL IA.RAPID: NOT DETECTED
TEST LOT EXPIRATION DATE: ABNORMAL
TEST LOT EXPIRATION DATE: NORMAL
TEST LOT EXPIRATION DATE: NORMAL
TEST LOT NUMBER: ABNORMAL
TEST LOT NUMBER: NORMAL
TEST LOT NUMBER: NORMAL

## 2025-02-02 PROCEDURE — 87426 SARSCOV CORONAVIRUS AG IA: CPT | Performed by: FAMILY MEDICINE

## 2025-02-02 PROCEDURE — 87880 STREP A ASSAY W/OPTIC: CPT | Performed by: FAMILY MEDICINE

## 2025-02-02 PROCEDURE — 87804 INFLUENZA ASSAY W/OPTIC: CPT | Performed by: FAMILY MEDICINE

## 2025-02-02 RX ORDER — ATORVASTATIN CALCIUM 10 MG/1
10 TABLET, FILM COATED ORAL AT BEDTIME
COMMUNITY
Start: 2024-12-06

## 2025-02-02 RX ORDER — LAMOTRIGINE 100 MG/1
100 TABLET ORAL
COMMUNITY
Start: 2024-09-06

## 2025-02-02 RX ORDER — BENZONATATE 100 MG/1
100-200 CAPSULE ORAL 3 TIMES DAILY PRN
Qty: 30 CAPSULE | Refills: 0 | Status: SHIPPED | OUTPATIENT
Start: 2025-02-02 | End: 2025-02-07

## 2025-02-02 RX ORDER — METOPROLOL SUCCINATE 25 MG/1
TABLET, EXTENDED RELEASE ORAL
COMMUNITY
Start: 2024-11-19

## 2025-02-02 RX ORDER — FLUTICASONE PROPIONATE 50 MCG
SPRAY, SUSPENSION (ML) NASAL
COMMUNITY

## 2025-03-08 DIAGNOSIS — R56.9 SEIZURE (HCC): ICD-10-CM

## 2025-03-10 RX ORDER — LAMOTRIGINE 100 MG/1
100 TABLET ORAL 2 TIMES DAILY
Qty: 180 TABLET | Refills: 1 | Status: SHIPPED | OUTPATIENT
Start: 2025-03-10

## 2025-03-10 NOTE — TELEPHONE ENCOUNTER
Medication: Lamotrigine 100 mg     Date of last refill: 09/06/2024 with 1 addt refill  [place]      Last office visit: 12/18/2024  Due back to clinic per last office note:    Date next office visit scheduled:    Future Appointments   Date Time Provider Department Center   6/18/2025 10:40 AM Felix Sam MD [facility]           Last OV note recommendation:    Impression/ Plan:  Rossy Rdz is a 79 year old female with PMHx including but not limited to HTN, HLD, who presented to [facility] 6/27/2023.  Patient was seen by neurology in consultation at hospital but first seen by this author 6/27/2023.     Per discussion with patient as well as chart review, patient presented to ER 6/14/2023, after having a fall and loss of awareness. Patient reportedly was walking her dog and then returned home and daughter noted she was confused and appeared to have bitten her lip.  Patient did not recall going on the walk. MRI brain was done and showed no acute stroke. EEG was normal but given her presentation, she was started on anti-seizure medication with lamotrigine (Lamictal) and slowly titrated up to 50 mg bid.         Currently, she denies any recurrent seizures and denies any events of loss of awareness or auras of odd tastes, smells or sounds. She denies side effects of balance issues, falls, double vision or rash on the lamotrigine but feels drowsy.          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  She denies prior history of stroke but admits she fell down a couple of years ago when walking and states she had injury as a child where she \"cracked her head\" on the left side of the head; question is raised if this is risk factor for seizures; EEG was normal but given presentation, suspect underlying seizure disorder and will continue Lamictal.  She is now up  to 100 mg bid from prior dose of 50 mg bid and doing well- patient advised typical maintenance dose for monotherapy ~200-300 mg / daily and recommend continue 100 mg bid- most recent levels as of 12/13/2023 were in range and she continues to do well       She has some balance issues and decreased vibratory sensation - Lamotrigine levels normal and NCS/EMG was normal with no evidence of large fiber polyneuropathy thus far - will repeat levels       In addition, patient advised to monitor for subtle signs of seizures and educated on seizure safety.   Patient with no seizures in 6 months and advised ok to drive     1. Seizure (HCC)  As noted above   - Lamotrigine (Lamictal), Serum; Future     Return in about 6 months (around 6/18/2025).

## 2025-05-30 DIAGNOSIS — R56.9 SEIZURE (HCC): ICD-10-CM

## 2025-06-02 RX ORDER — LAMOTRIGINE 25 MG/1
TABLET ORAL
Qty: 120 TABLET | Refills: 2 | OUTPATIENT
Start: 2025-06-02

## 2025-06-02 NOTE — TELEPHONE ENCOUNTER
Lamotrigine 100mg refilled 3/10/25 #180/1R to requesting pharmacy  Kirby msg sent to pt notifying to contact pharmacy for refill    RN, please refuse as per above

## 2025-06-25 ENCOUNTER — OFFICE VISIT (OUTPATIENT)
Dept: NEUROLOGY | Facility: CLINIC | Age: 80
End: 2025-06-25
Payer: MEDICARE

## 2025-06-25 VITALS
HEART RATE: 72 BPM | SYSTOLIC BLOOD PRESSURE: 152 MMHG | WEIGHT: 154 LBS | RESPIRATION RATE: 16 BRPM | HEIGHT: 67.5 IN | BODY MASS INDEX: 23.89 KG/M2 | DIASTOLIC BLOOD PRESSURE: 72 MMHG | OXYGEN SATURATION: 97 %

## 2025-06-25 DIAGNOSIS — R56.9 SEIZURE (HCC): Primary | ICD-10-CM

## 2025-06-25 DIAGNOSIS — G31.84 MILD COGNITIVE IMPAIRMENT WITH MEMORY LOSS: ICD-10-CM

## 2025-06-25 PROCEDURE — 99213 OFFICE O/P EST LOW 20 MIN: CPT | Performed by: OTHER

## 2025-06-25 RX ORDER — METOPROLOL SUCCINATE 50 MG/1
50 TABLET, EXTENDED RELEASE ORAL DAILY
COMMUNITY
Start: 2025-04-15

## 2025-06-25 NOTE — PATIENT INSTRUCTIONS
Refill policies:    Allow 2-3 business days for refills; controlled substances may take longer.  Contact your pharmacy at least 5 days prior to running out of medication and have them send an electronic request or submit request through the “request refill” option in your [facility] account.  Refills are not addressed on weekends; covering physicians do not authorize routine medications on weekends.  No narcotics or controlled substances are refilled after noon on Fridays or by on call physicians.  By law, narcotics must be electronically prescribed.  A 30 day supply with no refills is the maximum allowed.  If your prescription is due for a refill, you may be due for a follow up appointment.  To best provide you care, patients receiving routine medications need to be seen at least once a year.  Patients receiving narcotic/controlled substance medications need to be seen at least once every 3 months.  In the event that your preferred pharmacy does not have the requested medication in stock (e.g. Backordered), it is your responsibility to find another pharmacy that has the requested medication available.  We will gladly send a new prescription to that pharmacy at your request.    Scheduling Tests:    If your physician has ordered radiology tests such as MRI or CT scans, please contact Central Scheduling at [phone] right away to schedule the test.  Once scheduled, the [facility] Centralized Referral Team will work with your insurance carrier to obtain pre-certification or prior authorization.  Depending on your insurance carrier, approval may take 3-10 days.  It is highly recommended patients assure they have received an authorization before having a test performed.  If test is done without insurance authorization, patient may be responsible for the entire amount billed.      Precertification and Prior Authorizations:  If your physician has recommended that you have a procedure or additional testing performed the [facility]  Centralized Referral Team will contact your insurance carrier to obtain pre-certification or prior authorization.    You are strongly encouraged to contact your insurance carrier to verify that your procedure/test has been approved and is a COVERED benefit.  Although the [facility] Centralized Referral Team does its due diligence, the insurance carrier gives the disclaimer that \"Although the procedure is authorized, this does not guarantee payment.\"    Ultimately the patient is responsible for payment.   Thank you for your understanding in this matter.  Paperwork Completion:  If you require FMLA or disability paperwork for your recovery, please make sure to either drop it off or have it faxed to our office at [phone]. Be sure the form has your name and date of birth on it.  The form will be faxed to our Forms Department and they will complete it for you.  There is a 25$ fee for all forms that need to be filled out.  Please be aware there is a 10-14 day turnaround time.  You will need to sign a release of information (GEOVANNY) form if your paperwork does not come with one.  You may call the Forms Department with any questions at [phone].  Their fax number is [phone].

## 2025-06-25 NOTE — PROGRESS NOTES
[facility] Progress Note    HPI  Chief Complaint   Patient presents with    Seizure Disorder     F/u 6 month for seizure. Pt reports no issues       Initial notes as per 6/27/2023:   \"    Rossy Rdz is a 80 year old female with PMHx including but not limited to HTN, HLD, who presents to [facility] care.  Patient was recently seen by neurology in consultation at hospital but this is patient's first visit to this author.      Per discussion with patient as well as chart review, patient presented to ER 6/14/2023, after having a fall and loss of awareness. Patient reportedly was walking her dog and then returned home and daughter noted she was confused and appeared to have bitten her lip.  Patient did not recall going on the walk. MRI brain was done and showed no acute stroke. EEG was normal but given her presentation, she was started on anti-seizure medication with lamotrigine (Lamictal) and has been slowly titrated up to current dose of 50 mg bid.        Currently, she denies any recurrent seizures and denies any events of loss of awareness or auras of odd tastes, smells or sounds. She denies side effects balance issues, falls, double vision or rash on the lamotrigine but feels drowsy.       She denies prior history of stroke but admits she fell down a couple of years ago when walking and states she had injury as a child where she \"cracked her head\" on the left side of the head; she denies episodes of loss of awareness otherwise but has had several falls.     Otherwise, patient denies any recent weight change, fevers, chills, nausea, double vision/ blurry vision / loss of vision, chest pain, palpitations, shortness of breath, rashes, joint pains, bowel / bladder incontinence or mood issues. \"       Prior notes as per 9/27/2023.  Patient last seen 6/27/2023.  Since last visit, she has remained on Lamictal 50 mg bid. She denies any seizures or episodes of loss of awareness or auras of odd  tastes, smells or sounds; no side effects of balance issues, double vision, rash or falls. She admits to some \"brain fog\" episodes but no loss of awareness.  She has not had any additional head trauma.  She denies waking up in AM having wet the bed or bitten her tongue.         Prior notes as per 12/12/2023.  Patient last seen 9/27/2023.  Since last visit, she has been increased on Lamictal from 50 mg bid up to 100 mg bid. On this dose, she denies any seizures or episodes of loss of awareness or auras of odd tastes, smells or sounds; no side effects of balance issues, double vision, rash or falls.     She denies waking up in AM having wet the bed or bitten her tongue.      Prior notes as per 6/19/2024.  Patient last seen 12/12/2023.  She has been doing well overall and remains on Lamictal 100 mg bid.  She did have a rash ~1/3/2024, which was on right lower back above hip but did not progress to mucosal surfaces or hands and resolved with anti-fungal cream; she denies rash since this time and denies any seizures or episodes of loss of awareness or auras of odd tastes, smells or sounds; no side effects of double vision, rash or falls, but has some balance issues.     She denies waking up in AM having wet the bed or bitten her tongue.       Patient last seen 6/19/2024.   She remains on Lamictal 100 mg bid and denies any seizures or episodes of loss of awareness or auras of odd tastes, smells or sounds; no side effects of double vision, rash or falls, but has some balance issues; denies worsening.     She denies waking up in AM having wet the bed or bitten her tongue.        Patient last seen 12/18/2024.   She remains on Lamictal 100 mg bid - denies seizures or episodes of loss of awareness or auras of odd tastes, smells or sounds; no side effects of double vision, rash or falls, but has some balance issues; denies worsening.     She denies waking up in AM having wet the bed or bitten her tongue.       She admits to  misplacing objects in the house and has some issues with recalling conversations at times.           Past Medical History:    Elevated glucose    High blood pressure    High cholesterol    Hyperlipidemia    Hypertension     Past Surgical History:   Procedure Laterality Date    Excis lumbar disk,one level      Removal of tonsils,12+ y/o       Family History   Problem Relation Age of Onset    Cancer Father         stomach CA    Cancer Mother         breast CA     Social History     Socioeconomic History    Marital status:     Number of children: 3   Occupational History    Occupation: Retired    Tobacco Use    Smoking status: Former     Passive exposure: Never    Smokeless tobacco: Never   Vaping Use    Vaping status: Never Used   Substance and Sexual Activity    Alcohol use: No     Alcohol/week: 0.0 standard drinks of alcohol    Drug use: No   Other Topics Concern    Caffeine Concern Yes     Comment: 1-2 diet cokes a day    Exercise No       No Known Allergies      Current Outpatient Medications:     sertraline 50 MG Oral Tab, Take 1 tablet (50 mg total) by mouth daily., Disp: , Rfl:     metoprolol succinate ER 50 MG Oral Tablet 24 Hr, Take 1 tablet (50 mg total) by mouth daily., Disp: , Rfl:     LAMOTRIGINE 100 MG Oral Tab, TAKE 1 TABLET(100 MG) BY MOUTH TWICE DAILY, Disp: 180 tablet, Rfl: 1    atorvastatin 10 MG Oral Tab, TAKE 1 TABLET BY MOUTH EVERYDAY AT BEDTIME, Disp: 90 tablet, Rfl: 2    Fluticasone Propionate (FLONASE NA), by Nasal route., Disp: , Rfl:     MULTIVITAMINS OR TABS, 1 TABLET DAILY, Disp: , Rfl:     Review of Systems:  No chest pain or palpitations; otherwise as noted in HPI.    Exam:  /72 (BP Location: Left arm, Patient Position: Sitting, Cuff Size: adult)   Pulse 72   Resp 16   Ht 67.5\"   Wt 154 lb (69.9 kg)   SpO2 97%   BMI 23.76 kg/m²   Estimated body mass index is 23.76 kg/m² as calculated from the following:    Height as of this encounter: 67.5\".     Weight as of this encounter: 154 lb (69.9 kg).    Gen: well developed, well nourished, no acute distress  HEENT: normocephalic  Heart; normal S1/S2, regular rate and rhythm  Lungs: clear to auscultation bilaterally  Extremities: no edema, peripheral pulses intact    Neck: supple, full range of motion; no carotid bruits    Fundoscopic Exam: optic discs sharp bilaterally    Neuro:  Mental status:  Orientation: Alert and oriented to person, place, time  Speech Fluent and conversational    MOCA: 25/30  Visuospatial/ executive function: 5/5  Naming: 3/3  Attention: 6/6 (serial 7s [phone])  Language  3/3   Abstraction: 2/2  Delayed recall: 0/5 (1/5 with hints)  Orientation: 6/6    CN: PERRL, EOMI with no nystagmus, VFF, smile symmetric, sensation intact, tongue and palate midline, SCM intact, otherwise, CN 2-12 intact  Motor: 5/5 strength throughout, tone normal  DTR: 3+ brisk but symmetric throughout, toes downgoing bilaterally, no clonus  Sensory: intact to light touch throughout; present but reduced at toes ~3 sec to extinguish bilaterally  Coord: FNF intact with no tremor or dysmetria; rapid alternating movements intact bilaterally  Romberg: absent  Gait: normal casual, heel, toe gait but minimally off balance with tandem     Labs:    New    Component      Latest Ref Rng 12/18/2024   Lamotrigine Lvl      2.0 - 20.0 ug/mL 6.2      Prior as noted below    Component      Latest Ref Rng 6/19/2024   Vitamin B12      193 - 986 pg/mL 447    Lamotrigine Lvl      2.0 - 20.0 ug/mL 7.7      Prior as noted below    Component      Latest Ref Rng 12/13/2023   Lamotrigine Lvl      2.0 - 20.0 ug/mL 7.6          Prior as noted below    Component      Latest Ref Rng 7/3/2023   Lamotrigine Lvl      2.0 - 20.0 ug/mL 4.1        Prior as noted below      Component      Latest Ref Rng 6/14/2023 6/15/2023   WBC      4.0 - 11.0 x10(3) uL 7.4  8.6    RBC      3.80 - 5.30 x10(6)uL 4.91  4.56    Hemoglobin      12.0 - 16.0 g/dL 14.7   13.7    Hematocrit      35.0 - 48.0 % 45.8  41.8    Platelet Count      150.0 - 450.0 10(3)uL 289.0  263.0    MCV      80.0 - 100.0 fL 93.3  91.7    MCH      26.0 - 34.0 pg 29.9  30.0    MCHC      31.0 - 37.0 g/dL 32.1  32.8    RDW      % 12.9  13.2    Prelim Neutrophil Abs      1.50 - 7.70 x10 (3) uL 5.09  6.39    Neutrophils Absolute      1.50 - 7.70 x10(3) uL 5.09  6.39    Lymphocytes Absolute      1.00 - 4.00 x10(3) uL 1.57  1.23    Monocytes Absolute      0.10 - 1.00 x10(3) uL 0.56  0.87    Eosinophils Absolute      0.00 - 0.70 x10(3) uL 0.09  0.11    Basophils Absolute      0.00 - 0.20 x10(3) uL 0.05  0.02    Immature Granulocyte Absolute      0.00 - 1.00 x10(3) uL 0.01  0.02    Neutrophils %      % 69.1  74.0    Lymphocytes %      % 21.3  14.2    Monocytes %      % 7.6  10.1    Eosinophils %      % 1.2  1.3    Basophils %      % 0.7  0.2    Immature Granulocyte %      % 0.1  0.2    Glucose      70 - 99 mg/dL 134 (H)  114 (H)    Sodium      136 - 145 mmol/L 139  139    Potassium      3.5 - 5.1 mmol/L 3.6  4.7    Chloride      98 - 112 mmol/L 105  108    Carbon Dioxide, Total      21.0 - 32.0 mmol/L 26.0  25.0    ANION GAP      0 - 18 mmol/L 8  6    BUN      7 - 18 mg/dL 15  13    CREATININE      0.55 - 1.02 mg/dL 0.88  0.74    CALCIUM      8.5 - 10.1 mg/dL 9.2  9.3    CALCULATED OSMOLALITY      275 - 295 mOsm/kg 291  289    eGFR-Cr      >=60 mL/min/1.73m2 67  83    AST (SGOT)      15 - 37 U/L 26     ALT (SGPT)      13 - 56 U/L 31     ALKALINE PHOSPHATASE      55 - 142 U/L 88     Total Bilirubin      0.1 - 2.0 mg/dL 1.4     PROTEIN, TOTAL      6.4 - 8.2 g/dL 7.4     Albumin      3.4 - 5.0 g/dL 3.9     Globulin      2.8 - 4.4 g/dL 3.5     A/G Ratio      1.0 - 2.0  1.1     Color Urine      Yellow   Straw    Clarity Urine      Clear   Clear    Spec Gravity      1.001 - 1.030   1.008    Glucose Urine      Negative mg/dL  Negative    Bilirubin Urine      Negative   Negative    Ketones, UA      Negative mg/dL   Negative    Blood Urine      Negative   Moderate !    PH Urine      5.0 - 8.0   5.0    Protein Urine      Negative mg/dL  Negative    Urobilinogen Urine      <2.0 mg/dL  <2.0    Nitrite Urine      Negative   Negative    Leukocyte Esterase       Negative   Negative    WBC Urine      0 - 5 /HPF  1-5    RBC Urine      0 - 2 /HPF  0-2    Bacteria Urine      None Seen /HPF  None Seen    SQUAM EPI CELLS UR      None Seen /HPF  None Seen    RENAL TUBULAR EPITHELIAL CELLS      None Seen /HPF  None Seen    TRANSITIONAL EPI CELLS      None Seen /HPF  None Seen    YEAST URINE      None Seen /HPF  None Seen         Imaging    None new    Prior as noted below      FINDINGS:    MRI BRAIN  FINDINGS:  The ventricles and sulci are within normal limits.  FLAIR signal in periventricular and subcortical white matter is compatible chronic small vessel disease.  Encephalomalacia in left cerebellum is noted.  There is no midline shift or mass effect.  The  basal cisterns are patent.  The gray-white matter differentiation is intact.  The craniocervical junction is unremarkable.       There is no acute intracranial hemorrhage or extra-axial fluid collection identified.  There is no parenchymal signal abnormality identified.  No significant abnormal parenchymal gradient susceptibility.  There is no abnormal parenchymal or  leptomeningeal enhancement.  There is no restricted diffusion to suggest acute ischemia/infarction.     The visualized paranasal sinuses and mastoid air cells are unremarkable.  The expected major intracranial flow voids are present.        MRA BRAIN  INTRACRANIAL CAROTIDS:         No visible stenosis or aneurysm.  ANTERIOR CEREBRALS:         No visible stenosis or aneurysm.  ANTERIOR COMMUNICATING:  No visible stenosis or aneurysm.  MIDDLE CEREBRALS:         No visible stenosis or aneurysm.  POSTERIOR COMMUNICATING: No visible stenosis or aneurysm.  SUPERIOR CEREBELLARS:         No visible stenosis or aneurysm.  BASILAR:              No visible stenosis or aneurysm.  INTRACRANIAL VERTEBRALS: No visible significant stenosis or dissection.     MRA NECK  COMMON CAROTID:                 Normal for age with no visible significant stenosis or dissection.  CERVICAL INTERNAL CAROTID:  Normal for age with no visible significant stenosis or dissection.  EXTERNAL CAROTID:               Normal for age with no visible significant stenosis or dissection.  CERVICAL VERTEBRAL:               Normal for age with no visible significant stenosis or dissection.                   Impression   CONCLUSION:    1. No acute intracranial abnormality.  2. No acute abnormality on MR angiography of the head.       MRI BRAIN MRA HEAD+MRA NECK (ALL W+WO) (CPT=70553/12271/53520)    Result Date: 6/14/2023  CONCLUSION:  1. No acute intracranial abnormality. 2. No acute abnormality on MR angiography of the head.   Dictated by (CST): Sean López MD on 6/14/2023 at 7:25 PM     Finalized by (CST): Sean López MD on 6/14/2023 at 7:34 PM           CT BRAIN OR HEAD (63175)    Result Date: 6/14/2023  CONCLUSION:  Chronic changes.  No acute disease.   Dictated by (CST): Lei Chaves MD on 6/14/2023 at 11:04 AM     Finalized by (CST): Lei Chaves MD on 6/14/2023 at 11:07 AM       XR SHOULDER, COMPLETE (MIN 2 VIEWS), LEFT (CPT=73030)    Result Date: 6/14/2023  CONCLUSION:  No acute fractures.  Osteoarthritic changes.   Dictated by (CST): Emeka Wang MD on 6/14/2023 at 9:49 AM     Finalized by (CST): Emeka Wang MD on 6/14/2023 at 9:50 AM        Other procedures    None new    Prior as noted below    NCS/EMG (7/15/2024):       Sensory NCS      Nerve / Sites Rec. Site Onset Lat Peak Lat NP Amp PP Amp Segments Distance Velocity Comment       ms ms µV µV   cm m/s     R Sural - (Antidromic)      Calf Ankle 3.07 3.96 13.7 15.7 Calf - Ankle 13 42        Ref.   <=3.60 <=4.50 >=4.0 >=4.0 Ref.            2 Ankle 3.02 3.91 13.1 18.6           L Sural -  (Antidromic)      1 Ankle 3.54 4.43 13.7 20.2               Motor NCS      Nerve / Sites Muscle Latency Amplitude Segments Dist. Lat Diff Velocity Comments       ms mV   cm ms m/s     R Peroneal - EDB      Ankle EDB 4.46 5.3 Ankle - EDB 7            Ref.   <=6.50 >=1.1 Ref.              B. Fib Head EDB 12.40 4.6 B. Fib Head - Ankle 34 7.94 42.8        Ref.       Ref.     >=36.0     L Peroneal - EDB      Ankle EDB 4.06 5.2 Ankle - EDB 8            Ref.   <=6.50 >=1.1 Ref.              B. Fib Head EDB 12.73 3.8 B. Fib Head - Ankle 35.5 8.67 41.0        Ref.       Ref.     >=36.0     R Tibial - AH      Ankle AH 3.88 14.6 Ankle - AH 8            Ref.   <=6.10 >=1.1 Ref.              Knee AH 14.00 7.6 Knee - Ankle 40 10.13 39.5        Ref.       Ref.     >=34.0     L Tibial - AH      Ankle AH 3.96 17.6 Ankle - AH 8            Ref.   <=6.10 >=1.1 Ref.              Knee AH 14.23 9.8 Knee - Ankle 42 10.27 40.9        Ref.       Ref.     >=34.0         F  Wave      Nerve M Latency F Latency     ms ms   R Peroneal - EDB 4.1 53.5   Ref.   <=63.6   R Tibial - AH 4.4 56.5   Ref.   <=61.1   L Tibial - AH 4.8 57.9   Ref.   <=61.1   L Peroneal - EDB 3.9 55.1   Ref.   <=63.6                   EMG Summary Table       Spontaneous MUAP Recruitment   Muscle IA Fib PSW Fasc H.F. Amp Dur. PPP Pattern   R. Vastus medialis N None None None None N N N N   L. Vastus medialis N None None None None N N N N   R. Peroneus longus N None None None None N N N N   L. Peroneus longus N None None None None N N N N   R. Tibialis anterior N None None None None N N N N   L. Tibialis anterior N None None None None N N N N   R. Gastrocnemius N None None None None N N N N   L. Gastrocnemius N None None None None N N N N   R. Extensor hallucis longus N None None None None N N N N   L. Extensor hallucis longus N None None None None N N N N       Summary     The motor conduction test was normal in all 4 of the tested nerves: R Peroneal - EDB, L Peroneal - EDB,  R Tibial - AH, L Tibial - AH.     The sensory conduction test was performed on 2 nerve(s). The results were normal in 1 nerve(s): R Sural - (Antidromic). Findings were unremarkable in 1 nerve(s): L Sural - (Antidromic). There were no results outside the specified normal range.        The F wave study was unremarkable in all 4 of the tested nerves: R Peroneal - EDB, R Tibial - AH, L Tibial - AH, L Peroneal - EDB     The needle EMG study was normal in all 10 tested muscles: R. Vastus medialis, L. Vastus medialis, R. Peroneus longus, L. Peroneus longus, R. Tibialis anterior, L. Tibialis anterior, R. Gastrocnemius, L. Gastrocnemius, R. Extensor hallucis longus, L. Extensor hallucis longus.         Conclusion:   This is a normal study. There is no electrophysiologic evidence to suggest an underlying large fiber polyneuropathy, primary demyelinating neuropathy or myopathy. Of note, this test would not rule out a small fiber neuropathy or central etiology. Clinical correlation is recommended.     Prior as noted below    Reviewed    EEG (6/14/2023)    Background Activity:   The background activity consisted of 9 Hz waveforms, reactive to eye opening/ external stimulation.     Activation:     Hyperventilation:   Not performed.     Photic Stimulation:  Driving response seen.     Sleep:  Stage I sleep seen.      Impression:  Normal EEG. No focal, lateralized or generalized epileptiform activity seen.          Impression/ Plan:  Rossy Rdz is a 80 year old female with PMHx including but not limited to HTN, HLD, who presented to [facility] care 6/27/2023.  Patient was seen by neurology in consultation at hospital but first seen by this author 6/27/2023.     Per discussion with patient as well as chart review, patient presented to ER 6/14/2023, after having a fall and loss of awareness. Patient reportedly was walking her dog and then returned home and daughter noted she was confused and appeared to have bitten her lip.   Patient did not recall going on the walk. MRI brain was done and showed no acute stroke. EEG was normal but given her presentation, she was started on anti-seizure medication with lamotrigine (Lamictal) and slowly titrated up to 50 mg bid.        Currently, she denies any recurrent seizures and denies any events of loss of awareness or auras of odd tastes, smells or sounds. She denies side effects of balance issues, falls, double vision or rash on the lamotrigine but feels drowsy.         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  She denies prior history of stroke but admits she fell down a couple of years ago when walking and states she had injury as a child where she \"cracked her head\" on the left side of the head; question is raised if this is risk factor for seizures; EEG was normal but given presentation, suspect underlying seizure disorder and will continue Lamictal.  She is now up to 100 mg bid from prior dose of 50 mg bid and doing well- patient advised typical maintenance dose for monotherapy ~200-300 mg / daily and recommend continue 100 mg bid- most recent levels as of 12/18/2024 were in range and she continues to do well      She has some balance issues and decreased vibratory sensation - NCS/EMG was normal with no evidence of large fiber polyneuropathy thus far.      Cognitive evaluation shows some mild impairment with MOCA score 25/30 and she has been having some memory issues - will refer to neuropsychology for more detailed testing.      In addition, patient advised to monitor for subtle signs of seizures and educated on seizure safety.   Patient with no seizures in 6 months and advised ok to drive    1. Seizure (HCC)  As noted above     2. Mild cognitive impairment with memory loss  As noted above   - Psychology Referral - In Network    Return in about 6 months (around 12/25/2025).    Felix  MD Cecy, Neurology  [facility]  Pager [phone]  6/25/2025

## (undated) NOTE — ED AVS SNAPSHOT
[facility] in [address]    Phone:  [phone]    Fax:  [id number]   MRN: [id number]    Department:  THE [facility] OF [facility] in [facility]   Date of Visit:  4/19/2017 Please return to the ER/clinic if symptoms worsen.  Follow-up with your PCP in 24-48 hours as needed.     -Take pain medications as prescribed    -Apply ointment and F/U with PCP and dentist    Discharge References/Attachments     CONTUSION, FACIAL (ENGLISH to your personal doctor) about any new or lasting problems. The primary care or specialist physician will see patients referred from the [facility]. Follow-up care is at the discretion of that Physician.     IF THERE IS ANY CHANGE OR WORSENING O - If you have concerns related to behavioral health issues or thoughts of harming yourself, contact [address] [place] [address] at [phone].     - If you don’t have insurance, [facility] has partnered with [employer] If you've recently had a stay at the Hospital you can access your discharge instructions in [employer] by going to Visits < Admission Summaries.  If you've been to the Emergency Department or your doctor's office, you can view your past visit information in My